# Patient Record
Sex: FEMALE | Race: WHITE | NOT HISPANIC OR LATINO | Employment: UNEMPLOYED | URBAN - METROPOLITAN AREA
[De-identification: names, ages, dates, MRNs, and addresses within clinical notes are randomized per-mention and may not be internally consistent; named-entity substitution may affect disease eponyms.]

---

## 2023-12-04 ENCOUNTER — HOSPITAL ENCOUNTER (INPATIENT)
Facility: HOSPITAL | Age: 53
LOS: 9 days | Discharge: HOME | DRG: 753 | End: 2023-12-13
Attending: PSYCHIATRY & NEUROLOGY | Admitting: STUDENT IN AN ORGANIZED HEALTH CARE EDUCATION/TRAINING PROGRAM
Payer: COMMERCIAL

## 2023-12-04 ENCOUNTER — HOSPITAL ENCOUNTER (EMERGENCY)
Facility: HOSPITAL | Age: 53
End: 2023-12-04
Attending: FAMILY MEDICINE | Admitting: EMERGENCY MEDICINE
Payer: COMMERCIAL

## 2023-12-04 VITALS
TEMPERATURE: 98.6 F | OXYGEN SATURATION: 96 % | SYSTOLIC BLOOD PRESSURE: 169 MMHG | DIASTOLIC BLOOD PRESSURE: 87 MMHG | RESPIRATION RATE: 18 BRPM | HEART RATE: 94 BPM | WEIGHT: 125 LBS

## 2023-12-04 DIAGNOSIS — M54.50 CHRONIC BILATERAL LOW BACK PAIN WITHOUT SCIATICA: ICD-10-CM

## 2023-12-04 DIAGNOSIS — F33.2 SEVERE EPISODE OF RECURRENT MAJOR DEPRESSIVE DISORDER, WITHOUT PSYCHOTIC FEATURES (HCC): Primary | ICD-10-CM

## 2023-12-04 DIAGNOSIS — Z00.8 MEDICAL CLEARANCE FOR PSYCHIATRIC ADMISSION: ICD-10-CM

## 2023-12-04 DIAGNOSIS — Z00.8 ENCOUNTER FOR PSYCHOLOGICAL EVALUATION: Primary | ICD-10-CM

## 2023-12-04 DIAGNOSIS — R45.851 SUICIDAL IDEATIONS: ICD-10-CM

## 2023-12-04 DIAGNOSIS — E11.9 TYPE 2 DIABETES MELLITUS WITH HEMOGLOBIN A1C GOAL OF LESS THAN 7.0% (HCC): Chronic | ICD-10-CM

## 2023-12-04 DIAGNOSIS — G89.29 CHRONIC BILATERAL LOW BACK PAIN WITHOUT SCIATICA: ICD-10-CM

## 2023-12-04 DIAGNOSIS — Z72.0 TOBACCO ABUSE: ICD-10-CM

## 2023-12-04 LAB
ALBUMIN SERPL BCP-MCNC: 4.3 G/DL (ref 3.5–5)
ALP SERPL-CCNC: 61 U/L (ref 34–104)
ALT SERPL W P-5'-P-CCNC: 27 U/L (ref 7–52)
AMPHETAMINES SERPL QL SCN: NEGATIVE
ANION GAP SERPL CALCULATED.3IONS-SCNC: 8 MMOL/L
AST SERPL W P-5'-P-CCNC: 28 U/L (ref 13–39)
BACTERIA UR QL AUTO: ABNORMAL /HPF
BARBITURATES UR QL: NEGATIVE
BASOPHILS # BLD AUTO: 0.02 THOUSANDS/ÂΜL (ref 0–0.1)
BASOPHILS NFR BLD AUTO: 0 % (ref 0–1)
BENZODIAZ UR QL: NEGATIVE
BILIRUB SERPL-MCNC: 0.32 MG/DL (ref 0.2–1)
BILIRUB UR QL STRIP: ABNORMAL
BUN SERPL-MCNC: 18 MG/DL (ref 5–25)
CALCIUM SERPL-MCNC: 9.2 MG/DL (ref 8.4–10.2)
CHLORIDE SERPL-SCNC: 100 MMOL/L (ref 96–108)
CLARITY UR: CLEAR
CO2 SERPL-SCNC: 29 MMOL/L (ref 21–32)
COCAINE UR QL: NEGATIVE
COLOR UR: ABNORMAL
CREAT SERPL-MCNC: 0.61 MG/DL (ref 0.6–1.3)
EOSINOPHIL # BLD AUTO: 0.17 THOUSAND/ÂΜL (ref 0–0.61)
EOSINOPHIL NFR BLD AUTO: 4 % (ref 0–6)
ERYTHROCYTE [DISTWIDTH] IN BLOOD BY AUTOMATED COUNT: 12.5 % (ref 11.6–15.1)
ETHANOL EXG-MCNC: 0 MG/DL
GFR SERPL CREATININE-BSD FRML MDRD: 102 ML/MIN/1.73SQ M
GLUCOSE SERPL-MCNC: 162 MG/DL (ref 65–140)
GLUCOSE SERPL-MCNC: 188 MG/DL (ref 65–140)
GLUCOSE UR STRIP-MCNC: NEGATIVE MG/DL
HCT VFR BLD AUTO: 39.6 % (ref 34.8–46.1)
HGB BLD-MCNC: 12.8 G/DL (ref 11.5–15.4)
HGB UR QL STRIP.AUTO: NEGATIVE
HYALINE CASTS #/AREA URNS LPF: ABNORMAL /LPF
IMM GRANULOCYTES # BLD AUTO: 0.01 THOUSAND/UL (ref 0–0.2)
IMM GRANULOCYTES NFR BLD AUTO: 0 % (ref 0–2)
KETONES UR STRIP-MCNC: ABNORMAL MG/DL
LEUKOCYTE ESTERASE UR QL STRIP: NEGATIVE
LYMPHOCYTES # BLD AUTO: 1.83 THOUSANDS/ÂΜL (ref 0.6–4.47)
LYMPHOCYTES NFR BLD AUTO: 38 % (ref 14–44)
MCH RBC QN AUTO: 28.7 PG (ref 26.8–34.3)
MCHC RBC AUTO-ENTMCNC: 32.3 G/DL (ref 31.4–37.4)
MCV RBC AUTO: 89 FL (ref 82–98)
MONOCYTES # BLD AUTO: 0.58 THOUSAND/ÂΜL (ref 0.17–1.22)
MONOCYTES NFR BLD AUTO: 12 % (ref 4–12)
NEUTROPHILS # BLD AUTO: 2.18 THOUSANDS/ÂΜL (ref 1.85–7.62)
NEUTS SEG NFR BLD AUTO: 46 % (ref 43–75)
NITRITE UR QL STRIP: NEGATIVE
NON-SQ EPI CELLS URNS QL MICRO: ABNORMAL /HPF
NRBC BLD AUTO-RTO: 0 /100 WBCS
OPIATES UR QL SCN: NEGATIVE
OXYCODONE+OXYMORPHONE UR QL SCN: NEGATIVE
PCP UR QL: NEGATIVE
PH UR STRIP.AUTO: 5 [PH]
PLATELET # BLD AUTO: 249 THOUSANDS/UL (ref 149–390)
PMV BLD AUTO: 11.6 FL (ref 8.9–12.7)
POTASSIUM SERPL-SCNC: 3.8 MMOL/L (ref 3.5–5.3)
PROT SERPL-MCNC: 6.8 G/DL (ref 6.4–8.4)
PROT UR STRIP-MCNC: ABNORMAL MG/DL
RBC # BLD AUTO: 4.46 MILLION/UL (ref 3.81–5.12)
RBC #/AREA URNS AUTO: ABNORMAL /HPF
SODIUM SERPL-SCNC: 137 MMOL/L (ref 135–147)
SP GR UR STRIP.AUTO: >=1.03
THC UR QL: NEGATIVE
TSH SERPL DL<=0.05 MIU/L-ACNC: 0.69 UIU/ML (ref 0.45–4.5)
UROBILINOGEN UR QL STRIP.AUTO: 0.2 E.U./DL
WBC # BLD AUTO: 4.79 THOUSAND/UL (ref 4.31–10.16)
WBC #/AREA URNS AUTO: ABNORMAL /HPF

## 2023-12-04 PROCEDURE — 99285 EMERGENCY DEPT VISIT HI MDM: CPT | Performed by: FAMILY MEDICINE

## 2023-12-04 PROCEDURE — 81001 URINALYSIS AUTO W/SCOPE: CPT | Performed by: FAMILY MEDICINE

## 2023-12-04 PROCEDURE — 80053 COMPREHEN METABOLIC PANEL: CPT | Performed by: FAMILY MEDICINE

## 2023-12-04 PROCEDURE — 36415 COLL VENOUS BLD VENIPUNCTURE: CPT | Performed by: FAMILY MEDICINE

## 2023-12-04 PROCEDURE — 80307 DRUG TEST PRSMV CHEM ANLYZR: CPT | Performed by: FAMILY MEDICINE

## 2023-12-04 PROCEDURE — 99285 EMERGENCY DEPT VISIT HI MDM: CPT

## 2023-12-04 PROCEDURE — 82075 ASSAY OF BREATH ETHANOL: CPT | Performed by: FAMILY MEDICINE

## 2023-12-04 PROCEDURE — 93005 ELECTROCARDIOGRAM TRACING: CPT

## 2023-12-04 PROCEDURE — 81003 URINALYSIS AUTO W/O SCOPE: CPT | Performed by: FAMILY MEDICINE

## 2023-12-04 PROCEDURE — 84443 ASSAY THYROID STIM HORMONE: CPT | Performed by: FAMILY MEDICINE

## 2023-12-04 PROCEDURE — 85025 COMPLETE CBC W/AUTO DIFF WBC: CPT | Performed by: FAMILY MEDICINE

## 2023-12-04 PROCEDURE — 82948 REAGENT STRIP/BLOOD GLUCOSE: CPT

## 2023-12-04 RX ORDER — DULOXETIN HYDROCHLORIDE 30 MG/1
60 CAPSULE, DELAYED RELEASE ORAL DAILY
Status: DISCONTINUED | OUTPATIENT
Start: 2023-12-05 | End: 2023-12-04 | Stop reason: HOSPADM

## 2023-12-04 RX ORDER — NICOTINE 21 MG/24HR
21 PATCH, TRANSDERMAL 24 HOURS TRANSDERMAL ONCE
Status: DISCONTINUED | OUTPATIENT
Start: 2023-12-04 | End: 2023-12-04 | Stop reason: HOSPADM

## 2023-12-04 RX ORDER — INSULIN GLARGINE 100 [IU]/ML
20 INJECTION, SOLUTION SUBCUTANEOUS
Status: CANCELLED | OUTPATIENT
Start: 2023-12-04

## 2023-12-04 RX ORDER — ACETAMINOPHEN 325 MG/1
650 TABLET ORAL EVERY 6 HOURS PRN
Status: CANCELLED | OUTPATIENT
Start: 2023-12-04

## 2023-12-04 RX ORDER — IBUPROFEN 400 MG/1
400 TABLET ORAL EVERY 6 HOURS PRN
Status: CANCELLED | OUTPATIENT
Start: 2023-12-04

## 2023-12-04 RX ORDER — HYDROXYZINE 50 MG/1
50 TABLET, FILM COATED ORAL DAILY
Status: DISCONTINUED | OUTPATIENT
Start: 2023-12-04 | End: 2023-12-04 | Stop reason: HOSPADM

## 2023-12-04 RX ORDER — LANOLIN ALCOHOL/MO/W.PET/CERES
3 CREAM (GRAM) TOPICAL
Status: DISCONTINUED | OUTPATIENT
Start: 2023-12-04 | End: 2023-12-13 | Stop reason: HOSPADM

## 2023-12-04 RX ORDER — OLANZAPINE 2.5 MG/1
5 TABLET, FILM COATED ORAL
Status: CANCELLED | OUTPATIENT
Start: 2023-12-04

## 2023-12-04 RX ORDER — NICOTINE 21 MG/24HR
21 PATCH, TRANSDERMAL 24 HOURS TRANSDERMAL ONCE
Status: DISCONTINUED | OUTPATIENT
Start: 2023-12-05 | End: 2023-12-05

## 2023-12-04 RX ORDER — DULOXETIN HYDROCHLORIDE 30 MG/1
30 CAPSULE, DELAYED RELEASE ORAL
Status: DISCONTINUED | OUTPATIENT
Start: 2023-12-04 | End: 2023-12-04 | Stop reason: HOSPADM

## 2023-12-04 RX ORDER — METHOCARBAMOL 500 MG/1
750 TABLET, FILM COATED ORAL 3 TIMES DAILY PRN
Status: ON HOLD | COMMUNITY
Start: 2023-11-10

## 2023-12-04 RX ORDER — AMOXICILLIN 250 MG
1 CAPSULE ORAL DAILY PRN
Status: CANCELLED | OUTPATIENT
Start: 2023-12-04

## 2023-12-04 RX ORDER — MELOXICAM 15 MG/1
15 TABLET ORAL DAILY
Status: ON HOLD | COMMUNITY
Start: 2023-11-15 | End: 2024-11-14

## 2023-12-04 RX ORDER — TRAZODONE HYDROCHLORIDE 50 MG/1
50 TABLET ORAL
Status: DISCONTINUED | OUTPATIENT
Start: 2023-12-04 | End: 2023-12-08

## 2023-12-04 RX ORDER — INSULIN GLARGINE 100 [IU]/ML
20 INJECTION, SOLUTION SUBCUTANEOUS
Status: DISCONTINUED | OUTPATIENT
Start: 2023-12-04 | End: 2023-12-13 | Stop reason: HOSPADM

## 2023-12-04 RX ORDER — DULOXETIN HYDROCHLORIDE 30 MG/1
30 CAPSULE, DELAYED RELEASE ORAL
Status: ON HOLD | COMMUNITY
Start: 2023-11-10

## 2023-12-04 RX ORDER — IBUPROFEN 400 MG/1
400 TABLET ORAL EVERY 6 HOURS PRN
Status: DISCONTINUED | OUTPATIENT
Start: 2023-12-04 | End: 2023-12-05

## 2023-12-04 RX ORDER — GABAPENTIN 300 MG/1
300 CAPSULE ORAL 3 TIMES DAILY
Status: ON HOLD | COMMUNITY
Start: 2023-11-06 | End: 2024-11-05

## 2023-12-04 RX ORDER — LORAZEPAM 1 MG/1
1 TABLET ORAL
Status: CANCELLED | OUTPATIENT
Start: 2023-12-04

## 2023-12-04 RX ORDER — OLANZAPINE 2.5 MG/1
2.5 TABLET, FILM COATED ORAL
Status: DISCONTINUED | OUTPATIENT
Start: 2023-12-04 | End: 2023-12-13 | Stop reason: HOSPADM

## 2023-12-04 RX ORDER — PROPRANOLOL HYDROCHLORIDE 10 MG/1
5 TABLET ORAL EVERY 8 HOURS PRN
Status: CANCELLED | OUTPATIENT
Start: 2023-12-04

## 2023-12-04 RX ORDER — MAGNESIUM HYDROXIDE/ALUMINUM HYDROXICE/SIMETHICONE 120; 1200; 1200 MG/30ML; MG/30ML; MG/30ML
30 SUSPENSION ORAL EVERY 4 HOURS PRN
Status: CANCELLED | OUTPATIENT
Start: 2023-12-04

## 2023-12-04 RX ORDER — BENZTROPINE MESYLATE 1 MG/ML
1 INJECTION INTRAMUSCULAR; INTRAVENOUS
Status: CANCELLED | OUTPATIENT
Start: 2023-12-04

## 2023-12-04 RX ORDER — NICOTINE 21 MG/24HR
21 PATCH, TRANSDERMAL 24 HOURS TRANSDERMAL ONCE
Status: CANCELLED | OUTPATIENT
Start: 2023-12-05

## 2023-12-04 RX ORDER — OLANZAPINE 5 MG/1
5 TABLET ORAL
Status: DISCONTINUED | OUTPATIENT
Start: 2023-12-04 | End: 2023-12-13 | Stop reason: HOSPADM

## 2023-12-04 RX ORDER — AMOXICILLIN 250 MG
1 CAPSULE ORAL DAILY PRN
Status: DISCONTINUED | OUTPATIENT
Start: 2023-12-04 | End: 2023-12-13 | Stop reason: HOSPADM

## 2023-12-04 RX ORDER — HYDROXYZINE HYDROCHLORIDE 25 MG/1
25 TABLET, FILM COATED ORAL
Status: DISCONTINUED | OUTPATIENT
Start: 2023-12-04 | End: 2023-12-13 | Stop reason: HOSPADM

## 2023-12-04 RX ORDER — GABAPENTIN 300 MG/1
300 CAPSULE ORAL 3 TIMES DAILY
Status: DISCONTINUED | OUTPATIENT
Start: 2023-12-04 | End: 2023-12-04 | Stop reason: HOSPADM

## 2023-12-04 RX ORDER — LORAZEPAM 2 MG/ML
1 INJECTION INTRAMUSCULAR
Status: CANCELLED | OUTPATIENT
Start: 2023-12-04

## 2023-12-04 RX ORDER — INSULIN GLARGINE 100 [IU]/ML
20 INJECTION, SOLUTION SUBCUTANEOUS
Status: DISCONTINUED | OUTPATIENT
Start: 2023-12-04 | End: 2023-12-04 | Stop reason: HOSPADM

## 2023-12-04 RX ORDER — MAGNESIUM HYDROXIDE/ALUMINUM HYDROXICE/SIMETHICONE 120; 1200; 1200 MG/30ML; MG/30ML; MG/30ML
30 SUSPENSION ORAL EVERY 4 HOURS PRN
Status: DISCONTINUED | OUTPATIENT
Start: 2023-12-04 | End: 2023-12-13 | Stop reason: HOSPADM

## 2023-12-04 RX ORDER — POLYETHYLENE GLYCOL 3350 17 G/17G
17 POWDER, FOR SOLUTION ORAL DAILY PRN
Status: DISCONTINUED | OUTPATIENT
Start: 2023-12-04 | End: 2023-12-13 | Stop reason: HOSPADM

## 2023-12-04 RX ORDER — BENZTROPINE MESYLATE 1 MG/ML
1 INJECTION INTRAMUSCULAR; INTRAVENOUS
Status: DISCONTINUED | OUTPATIENT
Start: 2023-12-04 | End: 2023-12-13 | Stop reason: HOSPADM

## 2023-12-04 RX ORDER — ACETAMINOPHEN 325 MG/1
650 TABLET ORAL EVERY 6 HOURS PRN
Status: DISCONTINUED | OUTPATIENT
Start: 2023-12-04 | End: 2023-12-05

## 2023-12-04 RX ORDER — LORAZEPAM 2 MG/ML
1 INJECTION INTRAMUSCULAR
Status: DISCONTINUED | OUTPATIENT
Start: 2023-12-04 | End: 2023-12-13 | Stop reason: HOSPADM

## 2023-12-04 RX ORDER — HYDROXYZINE 50 MG/1
25 TABLET, FILM COATED ORAL
Status: CANCELLED | OUTPATIENT
Start: 2023-12-04

## 2023-12-04 RX ORDER — TRAZODONE HYDROCHLORIDE 50 MG/1
50 TABLET ORAL
Status: CANCELLED | OUTPATIENT
Start: 2023-12-04

## 2023-12-04 RX ORDER — INSULIN LISPRO 100 [IU]/ML
INJECTION, SOLUTION INTRAVENOUS; SUBCUTANEOUS
Status: ON HOLD | COMMUNITY

## 2023-12-04 RX ORDER — LANOLIN ALCOHOL/MO/W.PET/CERES
3 CREAM (GRAM) TOPICAL
Status: CANCELLED | OUTPATIENT
Start: 2023-12-04

## 2023-12-04 RX ORDER — BISACODYL 10 MG
10 SUPPOSITORY, RECTAL RECTAL DAILY PRN
Status: DISCONTINUED | OUTPATIENT
Start: 2023-12-04 | End: 2023-12-13 | Stop reason: HOSPADM

## 2023-12-04 RX ORDER — IBUPROFEN 400 MG/1
800 TABLET ORAL ONCE
Status: COMPLETED | OUTPATIENT
Start: 2023-12-04 | End: 2023-12-04

## 2023-12-04 RX ORDER — HYDROXYZINE 50 MG/1
50 TABLET, FILM COATED ORAL
Status: DISCONTINUED | OUTPATIENT
Start: 2023-12-04 | End: 2023-12-13 | Stop reason: HOSPADM

## 2023-12-04 RX ORDER — HYDROXYZINE 50 MG/1
50 TABLET, FILM COATED ORAL
Status: CANCELLED | OUTPATIENT
Start: 2023-12-04

## 2023-12-04 RX ORDER — LORAZEPAM 1 MG/1
1 TABLET ORAL
Status: DISCONTINUED | OUTPATIENT
Start: 2023-12-04 | End: 2023-12-13 | Stop reason: HOSPADM

## 2023-12-04 RX ORDER — PROPRANOLOL HYDROCHLORIDE 10 MG/1
5 TABLET ORAL EVERY 8 HOURS PRN
Status: DISCONTINUED | OUTPATIENT
Start: 2023-12-04 | End: 2023-12-13 | Stop reason: HOSPADM

## 2023-12-04 RX ORDER — BENZTROPINE MESYLATE 0.5 MG/1
0.5 TABLET ORAL
Status: DISCONTINUED | OUTPATIENT
Start: 2023-12-04 | End: 2023-12-13 | Stop reason: HOSPADM

## 2023-12-04 RX ORDER — INSULIN GLARGINE 100 [IU]/ML
20 INJECTION, SOLUTION SUBCUTANEOUS
Status: ON HOLD | COMMUNITY

## 2023-12-04 RX ORDER — IBUPROFEN 600 MG/1
600 TABLET ORAL EVERY 6 HOURS PRN
Status: DISCONTINUED | OUTPATIENT
Start: 2023-12-04 | End: 2023-12-05

## 2023-12-04 RX ORDER — OLANZAPINE 10 MG/2ML
5 INJECTION, POWDER, FOR SOLUTION INTRAMUSCULAR
Status: CANCELLED | OUTPATIENT
Start: 2023-12-04

## 2023-12-04 RX ORDER — OLANZAPINE 10 MG/2ML
5 INJECTION, POWDER, FOR SOLUTION INTRAMUSCULAR
Status: DISCONTINUED | OUTPATIENT
Start: 2023-12-04 | End: 2023-12-13 | Stop reason: HOSPADM

## 2023-12-04 RX ORDER — BISACODYL 10 MG
10 SUPPOSITORY, RECTAL RECTAL DAILY PRN
Status: CANCELLED | OUTPATIENT
Start: 2023-12-04

## 2023-12-04 RX ORDER — OLANZAPINE 2.5 MG/1
2.5 TABLET, FILM COATED ORAL
Status: CANCELLED | OUTPATIENT
Start: 2023-12-04

## 2023-12-04 RX ORDER — DULOXETIN HYDROCHLORIDE 60 MG/1
60 CAPSULE, DELAYED RELEASE ORAL DAILY
Status: ON HOLD | COMMUNITY
Start: 2023-11-10 | End: 2024-11-09

## 2023-12-04 RX ORDER — POLYETHYLENE GLYCOL 3350 17 G/17G
17 POWDER, FOR SOLUTION ORAL DAILY PRN
Status: CANCELLED | OUTPATIENT
Start: 2023-12-04

## 2023-12-04 RX ORDER — BENZTROPINE MESYLATE 0.5 MG/1
0.5 TABLET ORAL
Status: CANCELLED | OUTPATIENT
Start: 2023-12-04

## 2023-12-04 RX ORDER — MELOXICAM 7.5 MG/1
15 TABLET ORAL DAILY
Status: DISCONTINUED | OUTPATIENT
Start: 2023-12-04 | End: 2023-12-04 | Stop reason: HOSPADM

## 2023-12-04 RX ADMIN — METFORMIN HYDROCHLORIDE 1000 MG: 500 TABLET, FILM COATED ORAL at 18:37

## 2023-12-04 RX ADMIN — NICOTINE 21 MG: 21 PATCH, EXTENDED RELEASE TRANSDERMAL at 15:04

## 2023-12-04 RX ADMIN — HYDROXYZINE HYDROCHLORIDE 50 MG: 50 TABLET, FILM COATED ORAL at 11:13

## 2023-12-04 RX ADMIN — TRAZODONE HYDROCHLORIDE 50 MG: 50 TABLET ORAL at 21:29

## 2023-12-04 RX ADMIN — IBUPROFEN 800 MG: 400 TABLET, FILM COATED ORAL at 14:36

## 2023-12-04 RX ADMIN — INSULIN GLARGINE 20 UNITS: 100 INJECTION, SOLUTION SUBCUTANEOUS at 21:17

## 2023-12-04 RX ADMIN — GABAPENTIN 300 MG: 300 CAPSULE ORAL at 15:05

## 2023-12-04 RX ADMIN — IBUPROFEN 600 MG: 600 TABLET ORAL at 21:29

## 2023-12-04 RX ADMIN — MELATONIN TAB 3 MG 3 MG: 3 TAB at 21:17

## 2023-12-04 RX ADMIN — MELOXICAM 15 MG: 7.5 TABLET ORAL at 15:05

## 2023-12-04 NOTE — ED PROVIDER NOTES
History  Chief Complaint   Patient presents with    Psychiatric Evaluation     Patient arrives with  due to feeling suicidal. Patient has felt this way since Friday and this morning sat at a bus stop for 2 hours trying to gain the courage to jump in front of a bus. Last suicide attempt was in 2014 which she overdosed on medications. 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 Patient states that she felt this way since Friday and wanted to to jump in front of a bus. She states that she could not get her to jump. She denies a history of suicidal attempt in the past in 2014 when she overdosed on medication. Denies any chest pain shortness of breath. Awake alert oriented x 3 GCS 15. Prior to Admission Medications   Prescriptions Last Dose Informant Patient Reported? Taking?    DULoxetine (CYMBALTA) 30 mg delayed release capsule   Yes Yes   Sig: Take 30 mg by mouth daily at bedtime   DULoxetine (CYMBALTA) 60 mg delayed release capsule   Yes Yes   Sig: Take 60 mg by mouth daily   gabapentin (NEURONTIN) 300 mg capsule   Yes Yes   Sig: Take 300 mg by mouth Three times a day   insulin glargine (LANTUS) 100 units/mL subcutaneous injection   Yes Yes   Sig: Inject 20 Units under the skin daily at bedtime   insulin lispro (HumaLOG) 100 units/mL injection   Yes Yes   Sig: Inject under the skin Sliding scale   meloxicam (MOBIC) 15 mg tablet   Yes Yes   Sig: Take 15 mg by mouth daily   metFORMIN (GLUCOPHAGE) 1000 MG tablet   Yes Yes   Sig: Take 1,000 mg by mouth 2 (two) times a day   methocarbamol (ROBAXIN) 500 mg tablet   Yes Yes   Sig: Take 750 mg by mouth Three times daily as needed      Facility-Administered Medications: None       Past Medical History:   Diagnosis Date Diabetes mellitus ([address])     Hypercholesteremia        History reviewed. No pertinent surgical history. History reviewed. No pertinent family history. I have reviewed and agree with the history as documented. E-Cigarette/Vaping    E-Cigarette Use Never User      E-Cigarette/Vaping Substances     Social History     Tobacco Use    Smoking status: Every Day     Packs/day: 0.50     Types: Cigarettes    Smokeless tobacco: Former   Vaping Use    Vaping Use: Never used   Substance Use Topics    Alcohol use: Not Currently    Drug use: Not Currently       Review of Systems   Constitutional:  Negative for chills and fever. HENT:  Negative for rhinorrhea and sore throat. Eyes:  Negative for visual disturbance. Respiratory:  Negative for cough and shortness of breath. Cardiovascular:  Negative for chest pain and leg swelling. Gastrointestinal:  Negative for abdominal pain, diarrhea, nausea and vomiting. Genitourinary:  Negative for dysuria. Musculoskeletal:  Negative for back pain and myalgias. Skin:  Negative for rash. Neurological:  Negative for dizziness and headaches. Psychiatric/Behavioral:  Positive for suicidal ideas. Negative for confusion. The patient is nervous/anxious. All other systems reviewed and are negative. Physical Exam  Physical Exam  Vitals and nursing note reviewed. Constitutional:       Appearance: She is well-developed. HENT:      Head: Normocephalic and atraumatic. Right Ear: External ear normal.      Left Ear: External ear normal.      Nose: Nose normal.      Mouth/Throat:      Mouth: Mucous membranes are moist.      Pharynx: No oropharyngeal exudate. Eyes:      General: No scleral icterus. Right eye: No discharge. Left eye: No discharge. Conjunctiva/sclera: Conjunctivae normal.      Pupils: Pupils are equal, round, and reactive to light. Cardiovascular:      Rate and Rhythm: Normal rate and regular rhythm. Pulses: Normal pulses. Heart sounds: Normal heart sounds. Pulmonary:      Effort: Pulmonary effort is normal. No respiratory distress. Breath sounds: Normal breath sounds. No wheezing. Abdominal:      General: Bowel sounds are normal.      Palpations: Abdomen is soft. Musculoskeletal:         General: Normal range of motion. Cervical back: Normal range of motion and neck supple. Lymphadenopathy:      Cervical: No cervical adenopathy. Skin:     General: Skin is warm and dry. Capillary Refill: Capillary refill takes less than 2 seconds. Neurological:      General: No focal deficit present. Mental Status: She is alert and oriented to person, place, and time.    Psychiatric:         Mood and Affect: Mood normal.         Behavior: Behavior normal.         Vital Signs  ED Triage Vitals   Temperature Pulse Respirations Blood Pressure SpO2   12/04/23 1014 12/04/23 1006 12/04/23 1006 12/04/23 1006 12/04/23 1006   98.6 °F (37 °C) 94 18 169/87 96 %      Temp Source Heart Rate Source Patient Position - Orthostatic VS BP Location FiO2 (%)   12/04/23 1014 12/04/23 1006 12/04/23 1006 12/04/23 1006 --   Temporal Monitor Sitting Left arm       Pain Score       12/04/23 1006       7           Vitals:    12/04/23 1006   BP: 169/87   Pulse: 94   Patient Position - Orthostatic VS: Sitting         Visual Acuity      ED Medications  Medications   DULoxetine (CYMBALTA) delayed release capsule 30 mg (has no administration in time range)   DULoxetine (CYMBALTA) delayed release capsule 60 mg (has no administration in time range)   gabapentin (NEURONTIN) capsule 300 mg (300 mg Oral Given 12/4/23 1505)   metFORMIN (GLUCOPHAGE) tablet 1,000 mg (has no administration in time range)   insulin glargine (LANTUS) subcutaneous injection 20 Units 0.2 mL (has no administration in time range)   meloxicam (MOBIC) tablet 15 mg (15 mg Oral Given 12/4/23 1505)   hydrOXYzine HCL (ATARAX) tablet 50 mg (50 mg Oral Given 12/4/23 1113)   nicotine (NICODERM CQ) 21 mg/24 hr TD 24 hr patch 21 mg (21 mg Transdermal Medication Applied 12/4/23 1504)   ibuprofen (MOTRIN) tablet 800 mg (800 mg Oral Given 12/4/23 1436)       Diagnostic Studies  Results Reviewed       Procedure Component Value Units Date/Time    Urine Microscopic [[id number]]  (Abnormal) Collected: 12/04/23 1106    Lab Status: Final result Specimen: Urine, Clean Catch Updated: 12/04/23 1508     RBC, UA None Seen /hpf      WBC, UA 2-4 /hpf      Epithelial Cells Occasional /hpf      Bacteria, UA None Seen /hpf      Hyaline Casts, UA 0-1 /lpf     UA w Reflex to Microscopic w Reflex to Culture [[id number]]  (Abnormal) Collected: 12/04/23 1106    Lab Status: Final result Specimen: Urine, Clean Catch Updated: 12/04/23 1443     Color, UA Deb     Clarity, UA Clear     Specific Gravity, UA >=1.030     pH, UA 5.0     Leukocytes, UA Negative     Nitrite, UA Negative     Protein, UA 1+ mg/dl      Glucose, UA Negative mg/dl      Ketones, UA Trace mg/dl      Urobilinogen, UA 0.2 E.U./dl      Bilirubin, UA 1+     Occult Blood, UA Negative    TSH [[id number]]  (Normal) Collected: 12/04/23 1343    Lab Status: Final result Specimen: Blood from Arm, Left Updated: 12/04/23 1429     TSH 3RD GENERATON 0.692 uIU/mL     Comprehensive metabolic panel [[id number]]  (Abnormal) Collected: 12/04/23 1343    Lab Status: Final result Specimen: Blood from Arm, Left Updated: 12/04/23 1414     Sodium 137 mmol/L      Potassium 3.8 mmol/L      Chloride 100 mmol/L      CO2 29 mmol/L      ANION GAP 8 mmol/L      BUN 18 mg/dL      Creatinine 0.61 mg/dL      Glucose 188 mg/dL      Calcium 9.2 mg/dL      AST 28 U/L      ALT 27 U/L      Alkaline Phosphatase 61 U/L      Total Protein 6.8 g/dL      Albumin 4.3 g/dL      Total Bilirubin 0.32 mg/dL      eGFR 102 ml/min/1.73sq m     Narrative:      [facility] ([facility]D):     Stage 1 with normal or high GFR (GFR > 90 mL/min/1.73 square meters) Stage 2 Mild CKD (GFR = 60-89 mL/min/1.73 square meters)    Stage 3A Moderate CKD (GFR = 45-59 mL/min/1.73 square meters)    Stage 3B Moderate CKD (GFR = 30-44 mL/min/1.73 square meters)    Stage 4 Severe CKD (GFR = 15-29 mL/min/1.73 square meters)    Stage 5 End Stage CKD (GFR <15 mL/min/1.73 square meters)  Note: GFR calculation is accurate only with a steady state creatinine    CBC and differential [[id number]] Collected: 12/04/23 1343    Lab Status: Final result Specimen: Blood from Arm, Left Updated: 12/04/23 1354     WBC 4.79 Thousand/uL      RBC 4.46 Million/uL      Hemoglobin 12.8 g/dL      Hematocrit 39.6 %      MCV 89 fL      MCH 28.7 pg      MCHC 32.3 g/dL      RDW 12.5 %      MPV 11.6 fL      Platelets 624 Thousands/uL      nRBC 0 /100 WBCs      Neutrophils Relative 46 %      Immat GRANS % 0 %      Lymphocytes Relative 38 %      Monocytes Relative 12 %      Eosinophils Relative 4 %      Basophils Relative 0 %      Neutrophils Absolute 2.18 Thousands/µL      Immature Grans Absolute 0.01 Thousand/uL      Lymphocytes Absolute 1.83 Thousands/µL      Monocytes Absolute 0.58 Thousand/µL      Eosinophils Absolute 0.17 Thousand/µL      Basophils Absolute 0.02 Thousands/µL     Rapid drug screen, urine [[id number]] Collected: 12/04/23 1106    Lab Status: Final result Specimen: Urine, Clean Catch Updated: 12/04/23 1203     Amph/Meth UR Negative     Barbiturate Ur Negative     Benzodiazepine Urine Negative     Cocaine Urine Negative     Methadone Urine --     Opiate Urine Negative     PCP Ur Negative     THC Urine Negative     Oxycodone Urine Negative    Narrative:      Test not performed; call laboratory if required. FOR MEDICAL PURPOSES ONLY. IF CONFIRMATION NEEDED PLEASE CONTACT THE LAB WITHIN 5 DAYS.     Drug Screen Cutoff Levels:  AMPHETAMINE/METHAMPHETAMINES  1000 ng/mL  BARBITURATES     200 ng/mL  BENZODIAZEPINES     200 ng/mL  COCAINE      300 ng/mL  METHADONE      300 ng/mL  OPIATES      300 ng/mL  PHENCYCLIDINE     25 ng/mL  THC       50 ng/mL  OXYCODONE      100 ng/mL    POCT alcohol breath test [[id number]]  (Normal) Resulted: 12/04/23 1108    Lab Status: Final result Updated: 12/04/23 1108     EXTBreath Alcohol 0.00                   No orders to display              Procedures  Procedures         ED Course       Patient is medically clear for inpatient behavioral unit admission. Patient is seen by crisis patient signed 12. SBIRT 20yo+      Flowsheet Row Most Recent Value   Initial Alcohol Screen: US AUDIT-C     1. How often do you have a drink containing alcohol? 0 Filed at: 12/04/2023 1011   2. How many drinks containing alcohol do you have on a typical day you are drinking? 0 Filed at: 12/04/2023 1011   3a. Male UNDER 65: How often do you have five or more drinks on one occasion? 0 Filed at: 12/04/2023 1011   3b. FEMALE Any Age, or MALE 65+: How often do you have 4 or more drinks on one occassion? 0 Filed at: 12/04/2023 1011   Audit-C Score 0 Filed at: 12/04/2023 1011   STACIE: How many times in the past year have you. .. Used an illegal drug or used a prescription medication for non-medical reasons? Never Filed at: 12/04/2023 1011                      Medical Decision Making  49-year-old female in recovery from methamphetamine presented to ED with the suicidal ideation. History is taken from patient. Patient is medically clear for inpatient behavioral unit admission. Patient is seen by crisis patient signed 12. Pending placement  Patient care signed out to Natan Rivero    Amount and/or Complexity of Data Reviewed  Labs: ordered. Risk  OTC drugs. Prescription drug management. Decision regarding hospitalization.              Disposition  Final diagnoses:   Encounter for psychological evaluation   Suicidal ideations     Time reflects when diagnosis was documented in both MDM as applicable and the Disposition within this note       Time User Action Codes Description Comment    12/4/2023  1:58 PM Samuel Plascencia Add [Z00.8] Encounter for psychological evaluation     12/4/2023  1:58 PM Samuel Quinones [G57.880] Suicidal ideations           ED Disposition       ED Disposition   Transfer to [address] [place] [address]   --    Date/Time   Mon Dec 4, 2023 [address] should be transferred out to  and has been medically cleared. MD Documentation      Kimberly Payton Most Recent Value   Patient Condition The patient has been stabilized such that within reasonable medical probability, no material deterioration of the patient condition or the condition of the unborn child(hi) is likely to result from the transfer   Reason for Transfer Level of Care needed not available at this facility   Benefits of Transfer Continuity of care   Risks of Transfer Potential for delay in receiving treatment   Accepting Physician [address] [place] [address] Name, [address] [place] [address]    (Name & Tel number) [facility] [phone]   Transported by (Company and Unit #) [facility]andra [facility]rera   Sending MD [facility]   Provider Certification The patient is stable for psychiatric transfer because they are medically stable, and is protected from harming him/herself or others during transport          RN Documentation      [address] Name, [address] [place] [address]    ([facility] & Tel number) [facility] [phone]   Medications Reviewed with Next Provider of Service Yes   Transport Mode Ambulance   Transported by (Company and Unit #) [facility]   Level of Care Basic life support   Patient Belongings Disposition Sent with patient   Transfer Date 12/04/23   Transfer Time 1800          Follow-up Information    None         Patient's Medications   Discharge Prescriptions    No medications on file       No discharge procedures on file.     PDMP Review       None            ED Provider  Electronically Signed by             Gigi Callahan MD  12/04/23 2464

## 2023-12-04 NOTE — ED NOTES
Patient is accepted at [address] [facility] [address]  Patient is accepted by Dr. Solis Case  per [address] is arranged with [employer]. Transportation is TBD        Nurse report is to be called to [phone]  prior to patient transfer.

## 2023-12-04 NOTE — ED NOTES
Crisis prepared hospital packet, copies obtained for the record, EMTALA and Medical Necessity. Pt updated.

## 2023-12-04 NOTE — ED NOTES
Met with patient and completed the crisis intake assessment as well as the safety risk assessment. Patient arrived to the ER via private vehicle. Patients states she has been feeling overwhelmed and the death of her sons dog due to a hit and run last night was the straw that broke her. " I got up in the middle of the night and walked to the bus terminal and I contemplated on how to make it look like an accident so my family wouldn't think I was a coward or failure." Patient continues to report  SI with the inability to contract for safety. Patient also reports disturbances with sleep and appetite (weight loss of 50lbs x 9 months) as well as lack of concentration and motivation. Patient states she also suffers from chronic pain which lead to addiction issues. "Akil Jones been clean again sine Oct 2023"  addiction issues with opiates and meth. Patient in agreement to sign a voluntary admission. Voluntary rights and 72 hour notice explained to patient, patient verbalized and understanding. Original placed on patients chart. Bed search and insurance in progress.

## 2023-12-04 NOTE — LETTER
[address] [address]  Dept: [phone]      EMTALA TRANSFER CONSENT    NAME Maxwell Byers                                         1970                              MRN [id number]    I have been informed of my rights regarding examination, treatment, and transfer   by Dr. Yemi Joe MD    Benefits: Continuity of care    Risks: Potential for delay in receiving treatment      Consent for Transfer:  I acknowledge that my medical condition has been evaluated and explained to me by the emergency department physician or other qualified medical person and/or my attending physician, who has recommended that I be transferred to the service of  Accepting Physician: Bishnu Saenz at [address] [facility] [address] Name, [facility] : [place], [place]. The above potential benefits of such transfer, the potential risks associated with such transfer, and the probable risks of not being transferred have been explained to me, and I fully understand them. The doctor has explained that, in my case, the benefits of transfer outweigh the risks. I agree to be transferred. I authorize the performance of emergency medical procedures and treatments upon me in both transit and upon arrival at the receiving facility. Additionally, I authorize the release of any and all medical records to the receiving facility and request they be transported with me, if possible. I understand that the safest mode of transportation during a medical emergency is an ambulance and that the Hospital advocates the use of this mode of transport. Risks of traveling to the receiving facility by car, including absence of medical control, life sustaining equipment, such as oxygen, and medical personnel has been explained to me and I fully understand them. (NYDIA CORRECT BOX BELOW)  [ X ]  I consent to the stated transfer and to be transported by ambulance/helicopter.   [  ]  I consent to the stated transfer, but refuse transportation by ambulance and accept full responsibility for my transportation by car. I understand the risks of non-ambulance transfers and I exonerate the Hospital and its staff from any deterioration in my condition that results from this refusal.    X___________________________________________    DATE  23  TIME________  Signature of patient or legally responsible individual signing on patient behalf           RELATIONSHIP TO PATIENT___SELF______________________                    Provider Certification    NAME Rowena Franco                                         1970                              MRN [id number]    A medical screening exam was performed on the above named patient. Based on the examination:    Condition Necessitating Transfer The primary encounter diagnosis was Encounter for psychological evaluation. A diagnosis of Suicidal ideations was also pertinent to this visit. Patient Condition: The patient has been stabilized such that within reasonable medical probability, no material deterioration of the patient condition or the condition of the unborn child(hi) is likely to result from the transfer    Reason for Transfer: Level of Care needed not available at this facility    Transfer Requirements: Facility [facility], [facility], [address] available and qualified personnel available for treatment as acknowledged by [facility] [phone]  Agreed to accept transfer and to provide appropriate medical treatment as acknowledged by         Appropriate medical records of the examination and treatment of the patient are provided at the time of transfer   [address] __IK_____  Transfer will be performed by qualified personnel from [facility]  and appropriate transfer equipment as required, including the use of necessary and appropriate life support measures.     Provider Certification: I have examined the patient and explained the following risks and benefits of being transferred/refusing transfer to the patient/family:  The patient is stable for psychiatric transfer because they are medically stable, and is protected from harming him/herself or others during transport      Based on these reasonable risks and benefits to the patient and/or the unborn child(hi), and based upon the information available at the time of the patient’s examination, I certify that the medical benefits reasonably to be expected from the provision of appropriate medical treatments at another medical facility outweigh the increasing risks, if any, to the individual’s medical condition, and in the case of labor to the unborn child, from effecting the transfer.     X____________________________________________ DATE 12/04/23        TIME_______      ORIGINAL - SEND TO MEDICAL RECORDS   COPY - SEND WITH PATIENT DURING TRANSFER

## 2023-12-05 PROBLEM — F15.11 METHAMPHETAMINE ABUSE IN REMISSION (HCC): Status: ACTIVE | Noted: 2023-12-05

## 2023-12-05 PROBLEM — Z00.8 MEDICAL CLEARANCE FOR PSYCHIATRIC ADMISSION: Status: ACTIVE | Noted: 2023-12-05

## 2023-12-05 PROBLEM — Z72.0 TOBACCO ABUSE: Status: ACTIVE | Noted: 2023-12-05

## 2023-12-05 PROBLEM — E53.8 VITAMIN B12 DEFICIENCY: Status: ACTIVE | Noted: 2023-12-05

## 2023-12-05 PROBLEM — F11.90 OPIOID USE DISORDER: Chronic | Status: ACTIVE | Noted: 2023-12-05

## 2023-12-05 PROBLEM — G89.29 CHRONIC BILATERAL LOW BACK PAIN WITHOUT SCIATICA: Status: ACTIVE | Noted: 2020-06-24

## 2023-12-05 PROBLEM — M54.59 INTRACTABLE LOW BACK PAIN: Status: ACTIVE | Noted: 2023-10-12

## 2023-12-05 PROBLEM — F33.2 SEVERE EPISODE OF RECURRENT MAJOR DEPRESSIVE DISORDER, WITHOUT PSYCHOTIC FEATURES (HCC): Status: ACTIVE | Noted: 2023-12-05

## 2023-12-05 PROBLEM — M54.50 CHRONIC BILATERAL LOW BACK PAIN WITHOUT SCIATICA: Status: ACTIVE | Noted: 2020-06-24

## 2023-12-05 PROBLEM — Z00.8 MEDICAL CLEARANCE FOR PSYCHIATRIC ADMISSION: Status: RESOLVED | Noted: 2023-12-05 | Resolved: 2023-12-05

## 2023-12-05 LAB
25(OH)D3 SERPL-MCNC: 42 NG/ML (ref 30–100)
ALBUMIN SERPL BCP-MCNC: 4.3 G/DL (ref 3.5–5)
ALP SERPL-CCNC: 56 U/L (ref 34–104)
ALT SERPL W P-5'-P-CCNC: 23 U/L (ref 7–52)
ANION GAP SERPL CALCULATED.3IONS-SCNC: 8 MMOL/L
AST SERPL W P-5'-P-CCNC: 21 U/L (ref 13–39)
ATRIAL RATE: 94 BPM
BASOPHILS # BLD AUTO: 0.02 THOUSANDS/ÂΜL (ref 0–0.1)
BASOPHILS NFR BLD AUTO: 0 % (ref 0–1)
BILIRUB SERPL-MCNC: 0.47 MG/DL (ref 0.2–1)
BUN SERPL-MCNC: 17 MG/DL (ref 5–25)
CALCIUM SERPL-MCNC: 9.4 MG/DL (ref 8.4–10.2)
CHLORIDE SERPL-SCNC: 101 MMOL/L (ref 96–108)
CHOLEST SERPL-MCNC: 160 MG/DL
CO2 SERPL-SCNC: 28 MMOL/L (ref 21–32)
CREAT SERPL-MCNC: 0.51 MG/DL (ref 0.6–1.3)
EOSINOPHIL # BLD AUTO: 0.15 THOUSAND/ÂΜL (ref 0–0.61)
EOSINOPHIL NFR BLD AUTO: 2 % (ref 0–6)
ERYTHROCYTE [DISTWIDTH] IN BLOOD BY AUTOMATED COUNT: 12.7 % (ref 11.6–15.1)
EST. AVERAGE GLUCOSE BLD GHB EST-MCNC: 235 MG/DL
GFR SERPL CREATININE-BSD FRML MDRD: 108 ML/MIN/1.73SQ M
GLUCOSE P FAST SERPL-MCNC: 132 MG/DL (ref 65–99)
GLUCOSE SERPL-MCNC: 132 MG/DL (ref 65–140)
GLUCOSE SERPL-MCNC: 148 MG/DL (ref 65–140)
GLUCOSE SERPL-MCNC: 148 MG/DL (ref 65–140)
GLUCOSE SERPL-MCNC: 293 MG/DL (ref 65–140)
HBA1C MFR BLD: 9.8 %
HCT VFR BLD AUTO: 42.9 % (ref 34.8–46.1)
HDLC SERPL-MCNC: 64 MG/DL
HGB BLD-MCNC: 14 G/DL (ref 11.5–15.4)
IMM GRANULOCYTES # BLD AUTO: 0.02 THOUSAND/UL (ref 0–0.2)
IMM GRANULOCYTES NFR BLD AUTO: 0 % (ref 0–2)
LDLC SERPL CALC-MCNC: 83 MG/DL (ref 0–100)
LYMPHOCYTES # BLD AUTO: 2.03 THOUSANDS/ÂΜL (ref 0.6–4.47)
LYMPHOCYTES NFR BLD AUTO: 28 % (ref 14–44)
MAGNESIUM SERPL-MCNC: 1.9 MG/DL (ref 1.9–2.7)
MCH RBC QN AUTO: 28.2 PG (ref 26.8–34.3)
MCHC RBC AUTO-ENTMCNC: 32.6 G/DL (ref 31.4–37.4)
MCV RBC AUTO: 87 FL (ref 82–98)
MONOCYTES # BLD AUTO: 0.52 THOUSAND/ÂΜL (ref 0.17–1.22)
MONOCYTES NFR BLD AUTO: 7 % (ref 4–12)
NEUTROPHILS # BLD AUTO: 4.6 THOUSANDS/ÂΜL (ref 1.85–7.62)
NEUTS SEG NFR BLD AUTO: 63 % (ref 43–75)
NONHDLC SERPL-MCNC: 96 MG/DL
NRBC BLD AUTO-RTO: 0 /100 WBCS
P AXIS: 64 DEGREES
PLATELET # BLD AUTO: 257 THOUSANDS/UL (ref 149–390)
PMV BLD AUTO: 11.1 FL (ref 8.9–12.7)
POTASSIUM SERPL-SCNC: 4.3 MMOL/L (ref 3.5–5.3)
PR INTERVAL: 176 MS
PROT SERPL-MCNC: 6.5 G/DL (ref 6.4–8.4)
QRS AXIS: 15 DEGREES
QRSD INTERVAL: 88 MS
QT INTERVAL: 398 MS
QTC INTERVAL: 497 MS
RBC # BLD AUTO: 4.96 MILLION/UL (ref 3.81–5.12)
SODIUM SERPL-SCNC: 137 MMOL/L (ref 135–147)
T WAVE AXIS: 68 DEGREES
TRIGL SERPL-MCNC: 65 MG/DL
TSH SERPL DL<=0.05 MIU/L-ACNC: 0.64 UIU/ML (ref 0.45–4.5)
VENTRICULAR RATE: 94 BPM
VIT B12 SERPL-MCNC: 369 PG/ML (ref 180–914)
WBC # BLD AUTO: 7.34 THOUSAND/UL (ref 4.31–10.16)

## 2023-12-05 PROCEDURE — 82948 REAGENT STRIP/BLOOD GLUCOSE: CPT

## 2023-12-05 PROCEDURE — 80061 LIPID PANEL: CPT

## 2023-12-05 PROCEDURE — 83735 ASSAY OF MAGNESIUM: CPT

## 2023-12-05 PROCEDURE — 85025 COMPLETE CBC W/AUTO DIFF WBC: CPT

## 2023-12-05 PROCEDURE — 80053 COMPREHEN METABOLIC PANEL: CPT

## 2023-12-05 PROCEDURE — 82607 VITAMIN B-12: CPT

## 2023-12-05 PROCEDURE — 82306 VITAMIN D 25 HYDROXY: CPT

## 2023-12-05 PROCEDURE — 83036 HEMOGLOBIN GLYCOSYLATED A1C: CPT | Performed by: NURSE PRACTITIONER

## 2023-12-05 PROCEDURE — 99222 1ST HOSP IP/OBS MODERATE 55: CPT | Performed by: PSYCHIATRY & NEUROLOGY

## 2023-12-05 PROCEDURE — 84443 ASSAY THYROID STIM HORMONE: CPT

## 2023-12-05 PROCEDURE — 99221 1ST HOSP IP/OBS SF/LOW 40: CPT | Performed by: NURSE PRACTITIONER

## 2023-12-05 RX ORDER — IBUPROFEN 600 MG/1
600 TABLET ORAL EVERY 8 HOURS SCHEDULED
Status: DISCONTINUED | OUTPATIENT
Start: 2023-12-05 | End: 2023-12-13 | Stop reason: HOSPADM

## 2023-12-05 RX ORDER — CYANOCOBALAMIN 1000 UG/ML
1000 INJECTION, SOLUTION INTRAMUSCULAR; SUBCUTANEOUS
Status: DISCONTINUED | OUTPATIENT
Start: 2023-12-05 | End: 2023-12-13 | Stop reason: HOSPADM

## 2023-12-05 RX ORDER — NICOTINE 21 MG/24HR
1 PATCH, TRANSDERMAL 24 HOURS TRANSDERMAL DAILY
Status: DISCONTINUED | OUTPATIENT
Start: 2023-12-05 | End: 2023-12-13 | Stop reason: HOSPADM

## 2023-12-05 RX ORDER — MELOXICAM 15 MG/1
7.5 TABLET ORAL DAILY
Status: DISCONTINUED | OUTPATIENT
Start: 2023-12-05 | End: 2023-12-13 | Stop reason: HOSPADM

## 2023-12-05 RX ORDER — ARIPIPRAZOLE 2 MG/1
2 TABLET ORAL DAILY
Status: DISCONTINUED | OUTPATIENT
Start: 2023-12-05 | End: 2023-12-07

## 2023-12-05 RX ORDER — ALBUTEROL SULFATE 90 UG/1
2 AEROSOL, METERED RESPIRATORY (INHALATION) EVERY 4 HOURS PRN
Status: DISCONTINUED | OUTPATIENT
Start: 2023-12-05 | End: 2023-12-13 | Stop reason: HOSPADM

## 2023-12-05 RX ORDER — METHOCARBAMOL 750 MG/1
750 TABLET, FILM COATED ORAL EVERY 8 HOURS SCHEDULED
Status: DISCONTINUED | OUTPATIENT
Start: 2023-12-05 | End: 2023-12-13 | Stop reason: HOSPADM

## 2023-12-05 RX ORDER — INSULIN LISPRO 100 [IU]/ML
1-5 INJECTION, SOLUTION INTRAVENOUS; SUBCUTANEOUS
Status: DISCONTINUED | OUTPATIENT
Start: 2023-12-05 | End: 2023-12-13 | Stop reason: HOSPADM

## 2023-12-05 RX ORDER — INSULIN LISPRO 100 [IU]/ML
1-5 INJECTION, SOLUTION INTRAVENOUS; SUBCUTANEOUS
Status: DISCONTINUED | OUTPATIENT
Start: 2023-12-05 | End: 2023-12-11

## 2023-12-05 RX ORDER — LIDOCAINE 50 MG/G
1 PATCH TOPICAL DAILY
Status: DISCONTINUED | OUTPATIENT
Start: 2023-12-05 | End: 2023-12-08

## 2023-12-05 RX ORDER — GABAPENTIN 300 MG/1
600 CAPSULE ORAL 3 TIMES DAILY
Status: DISCONTINUED | OUTPATIENT
Start: 2023-12-05 | End: 2023-12-13 | Stop reason: HOSPADM

## 2023-12-05 RX ORDER — ACETAMINOPHEN 325 MG/1
975 TABLET ORAL EVERY 6 HOURS SCHEDULED
Status: DISCONTINUED | OUTPATIENT
Start: 2023-12-05 | End: 2023-12-13 | Stop reason: HOSPADM

## 2023-12-05 RX ORDER — NICOTINE 21 MG/24HR
1 PATCH, TRANSDERMAL 24 HOURS TRANSDERMAL DAILY
Status: DISCONTINUED | OUTPATIENT
Start: 2023-12-06 | End: 2023-12-05

## 2023-12-05 RX ADMIN — GABAPENTIN 600 MG: 300 CAPSULE ORAL at 21:18

## 2023-12-05 RX ADMIN — LIDOCAINE 1 PATCH: 700 PATCH TOPICAL at 11:35

## 2023-12-05 RX ADMIN — ACETAMINOPHEN 650 MG: 325 TABLET ORAL at 08:37

## 2023-12-05 RX ADMIN — HYDROXYZINE HYDROCHLORIDE 50 MG: 50 TABLET, FILM COATED ORAL at 08:37

## 2023-12-05 RX ADMIN — MELOXICAM 7.5 MG: 15 TABLET ORAL at 11:37

## 2023-12-05 RX ADMIN — IBUPROFEN 600 MG: 600 TABLET ORAL at 05:31

## 2023-12-05 RX ADMIN — CYANOCOBALAMIN 1000 MCG: 1000 INJECTION, SOLUTION INTRAMUSCULAR; SUBCUTANEOUS at 13:16

## 2023-12-05 RX ADMIN — INSULIN GLARGINE 20 UNITS: 100 INJECTION, SOLUTION SUBCUTANEOUS at 21:37

## 2023-12-05 RX ADMIN — GABAPENTIN 600 MG: 300 CAPSULE ORAL at 11:33

## 2023-12-05 RX ADMIN — GABAPENTIN 600 MG: 300 CAPSULE ORAL at 16:40

## 2023-12-05 RX ADMIN — ARIPIPRAZOLE 2 MG: 2 TABLET ORAL at 13:15

## 2023-12-05 RX ADMIN — IBUPROFEN 600 MG: 600 TABLET ORAL at 21:18

## 2023-12-05 RX ADMIN — ACETAMINOPHEN 975 MG: 325 TABLET ORAL at 17:05

## 2023-12-05 RX ADMIN — IBUPROFEN 600 MG: 600 TABLET ORAL at 13:16

## 2023-12-05 RX ADMIN — NICOTINE 1 PATCH: 21 PATCH, EXTENDED RELEASE TRANSDERMAL at 16:41

## 2023-12-05 RX ADMIN — METHOCARBAMOL 750 MG: 750 TABLET, FILM COATED ORAL at 21:18

## 2023-12-05 RX ADMIN — MELATONIN TAB 3 MG 3 MG: 3 TAB at 21:18

## 2023-12-05 RX ADMIN — INSULIN LISPRO 2 UNITS: 100 INJECTION, SOLUTION INTRAVENOUS; SUBCUTANEOUS at 12:24

## 2023-12-05 RX ADMIN — METHOCARBAMOL 750 MG: 750 TABLET, FILM COATED ORAL at 13:16

## 2023-12-05 RX ADMIN — DULOXETINE HYDROCHLORIDE 90 MG: 30 CAPSULE, DELAYED RELEASE ORAL at 13:15

## 2023-12-05 RX ADMIN — METFORMIN HYDROCHLORIDE 1000 MG: 500 TABLET, FILM COATED ORAL at 16:40

## 2023-12-05 NOTE — PROGRESS NOTES
12/05/23 0841   Team Meeting   Meeting Type Daily Rounds   Initial Conference Date 12/05/23   Team Members Present   Team Members Present Physician;Nurse;;; Occupational Therapist   Physician Team Member [facility] Team Member [facility] Management Team Member [address] Work Team Member Ed   OT Team Member Moises Delacruz   Patient/Family Present   Patient Present No   Patient's Family Present No     Patient here on a 12 after she was having SI to jump in front of a bus. She was unable to console her grandson after his dog was killed by a hit and run and she has had chronic pain. She labeled her grandson as a protective factor. She is unable to currently contract for safety. Discharge is pending.

## 2023-12-05 NOTE — PLAN OF CARE
Pt. Receptive to social interaction from peers and has begun to attend some groups.   Problem: Ineffective Coping  Goal: Participates in unit activities  Description: Interventions:  - Provide therapeutic environment   - Provide required programming   - Redirect inappropriate behaviors   Outcome: Progressing

## 2023-12-05 NOTE — PROGRESS NOTES
12/05/23 1457   Team Meeting   Meeting Type Tx Team Meeting   Initial Conference Date 12/05/23   Team Members Present   Team Members Present Physician;Nurse;   Physician Team Member Yris   Nursing Team Member [facility] Management Team Member Erickchantell Martinez   Patient/Family Present   Patient Present Yes   Patient's Family Present No     Tx plan was reviewed and discussed with Pt. Pt was encouraged to attend groups. Medication was discussed with Pt. Pt signed tx plan.

## 2023-12-05 NOTE — NURSING NOTE
Pt admitted from [facility] ED as a 201. Pt reports SI to jump in front of a bus due to "mental breakdown" caused by chronic pain and being unable to console grandson after his dog . Pt reports chronic back pain. Pt reports she was told they have to do testing before surgery but her appointment will not be until February. Pt states " I can't wait that long." Pt denies AH/VH/VI and SI at this time.

## 2023-12-05 NOTE — NURSING NOTE
Patient c/o 10/10 low back pain. She states "I have chronic low back pain. I'm supposed to be getting a lumbar fusion." PRN Motrin 600 mg administered at 0531.

## 2023-12-05 NOTE — ASSESSMENT & PLAN NOTE
Lab Results   Component Value Date    HGBA1C 13.6 (H) 02/08/2023       Recent Labs     12/04/23 2139 12/05/23  1221   POCGLU 162* 293*       Blood Sugar Average: Last 72 hrs:  (P) 227.5  Patient will continue on her home dose of Lantus 20 units SQ nightly  Patient will have 4 times daily blood sugars with sliding scale insulin  Patient will have an A1c added to her labs

## 2023-12-05 NOTE — PROGRESS NOTES
12/05/23 1030 12/05/23 1100 12/05/23 1300   Activity/Group Checklist   Group Community meeting Other (Comment)  ( Recovery: wellness and do's) Pet therapy   Attendance Did not attend Did not attend Attended   Attendance Duration (min)  --   --  0-15   Interactions  --   --  Interacted appropriately   Affect/Mood  --   --  Appropriate;Normal range   Goals Achieved  --   --  Able to engage in interactions

## 2023-12-05 NOTE — TREATMENT TEAM
12/05/23 0837   Pain Assessment   Pain Assessment Tool 0-10   Pain Score 10 - Worst Possible Pain   Pain Location/Orientation Orientation: Right;Orientation: Lower; Location: Back   Pain Radiating Towards legs   Pain Onset/Description Onset: Ongoing;Frequency: Constant/Continuous   Effect of Pain on Daily Activities limits mobility   Patient's Stated Pain Goal No pain   Hospital Pain Intervention(s) Medication (See MAR)     Patient approached this writer and requested medication for severe pain. Patient was offered and accepted Tylenol 650 MG PO. Will reassess.

## 2023-12-05 NOTE — ASSESSMENT & PLAN NOTE
Patient has a longstanding history of tobacco abuse greater than 1 year smokes at least 1 pack/day  Patient will use a nicotine patch for nicotine replacement therapy  Smoking cessation education

## 2023-12-05 NOTE — ASSESSMENT & PLAN NOTE
Patient has been in complete recovery since 10 of 2023  Is having coping issues with her back pain as well is affecting her mental wellbeing

## 2023-12-05 NOTE — ASSESSMENT & PLAN NOTE
Vital signs stable afebrile patient seen and examined by myself Labs from today 12/5/2023 sodium 137 potassium 4.3 creatinine 0.51 vitamin B12 369  Patient medically stable for admit  SL IM will sign off please call with any questions or concerns

## 2023-12-05 NOTE — TREATMENT PLAN
TREATMENT PLAN REVIEW - [address] [facility] 52 y.o. 1970 female MRN: [id number]    [facility] Room / Bed: [place] [address]/OABHU 772-45 Encounter: [id number]          Admit Date/Time:  12/4/2023  7:44 PM    Treatment Team: Attending Provider: Stuart Fernandez DO; Patient Care Assistant: Geri Slater; Patient Care Technician: Sergio Ham;  Patient Care Assistant: Erica Gallagher; Registered Nurse: Chuy Brian RN; Occupational Therapy Assistant: Ebony Pierre; Nurse Manager: Frankie Nava RN    Diagnosis: Principal Problem:    Severe episode of recurrent major depressive disorder, without psychotic features ([address])  Active Problems:    Medical clearance for psychiatric admission    Intractable low back pain    Type 2 diabetes mellitus with hemoglobin A1c goal of less than 7.0% ([address])    Vitamin B12 deficiency    Methamphetamine abuse in remission ([address])    Tobacco abuse    Opioid use disorder      Patient Strengths/Assets: family ties, good support system, motivation for treatment/growth, negotiates basic needs, patient is on a voluntary commitment, resourceful    Patient Barriers/Limitations: homeless, poor physical health, substance use    Short Term Goals: decrease in depressive symptoms, decrease in anxiety symptoms, decrease in suicidal thoughts, mood stabilization    Long Term Goals: improvement in depression, improvement in anxiety, stabilization of mood, free of suicidal thoughts    Progress Towards Goals: starting psychiatric medications as prescribed    Recommended Treatment: medication management, patient medication education, group therapy, milieu therapy, continued Behavioral Health psychiatric evaluation/assessment process    Treatment Frequency: daily medication monitoring, group and milieu therapy daily, monitoring through interdisciplinary rounds, monitoring through weekly patient care conferences    Expected Discharge Date:  5-7 midnights    Discharge Plan: referrals as indicated    Treatment Plan Created/Updated By: Loida Shafer DO
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
## 2023-12-05 NOTE — CONSULTS
[facility]  Consult  Name: Lennox Vargas 52 y.o. female I MRN: [id number]  Unit/Bed#: Maya Medley 771-82 I Date of Admission: 12/4/2023   Date of Service: 12/5/2023 I Hospital Day: 1    Inpatient consult for Medical Clearance for [facility] patient  Consult performed by: KENNETH Perdomo  Consult ordered by: KENNETH Stubbs          Assessment/Plan   Medical clearance for psychiatric admission  Assessment & Plan  Vital signs stable afebrile patient seen and examined by myself Labs from today 12/5/2023 sodium 137 potassium 4.3 creatinine 0.51 vitamin B12 369  Patient medically stable for admit  SL IM will sign off please call with any questions or concerns    Tobacco abuse  Assessment & Plan  Patient has a longstanding history of tobacco abuse greater than 1 year smokes at least 1 pack/day  Patient will use a nicotine patch for nicotine replacement therapy  Smoking cessation education    Methamphetamine abuse in remission [facility])  Assessment & Plan  Patient has been in complete recovery since 10 of 2023  Is having coping issues with her back pain as well is affecting her mental wellbeing    Vitamin B12 deficiency  Assessment & Plan  Patient's vitamin B12 level today was 369  Patient will get an IM injection of vitamin B12 1000 mcg x 1  Patient will start tomorrow on 12 6 on vitamin B12 1000 mcg p.o. daily    Type 2 diabetes mellitus with hemoglobin A1c goal of less than 7.0% [facility])  Assessment & Plan  Lab Results   Component Value Date    HGBA1C 13.6 (H) 02/08/2023       Recent Labs     12/04/23  2139 12/05/23  1221   POCGLU 162* 293*       Blood Sugar Average: Last 72 hrs:  (P) 227.5  Patient will continue on her home dose of Lantus 20 units SQ nightly  Patient will have 4 times daily blood sugars with sliding scale insulin  Patient will have an A1c added to her labs    Intractable low back pain  Assessment & Plan  Patient has longstanding chronic back pain and is in the process of being worked up to have a spinal fusion  Patient will continue on her home regiment of pain medicine which is as follows  Tylenol 975 p.o. every 6 hours standing  Motrin 600 mg every 8 hours standing  Robaxin 750 mg every 8 hours standing  Mobic 15 mg p.o. daily  Neurontin 600 mg p.o. 3 times daily  Lidoderm patch to her right lower back and buttocks area region daily  Psych will also input as to further pain management        Counseling / Coordination of Care Time: 45 minutes. Greater than 50% of total time spent on patient counseling and coordination of care. Collaboration of Care: Were Recommendations Directly Discussed with Primary Treatment Team? - No     History of Present Illness:    Araseli Roberson is a 52 y.o. female who is originally admitted to the psychiatry service due to [place] wanting to jump in front of a bus. We are consulted for medical clearance for admission to [address] [place] [address] and treatment of underlying psychiatric illness. [facility] [address] [place], [place] [address] after having wanting to jump in front of a bus to end her life. She has irretractable back pain and is a recent newly recovered meth amphetamine addict who has had a mental breakdown. Past medical history methamphetamine use insulin-dependent diabetes mellitus type 2 spinal stenosis back pain tobacco abuse. Review of Systems:    Review of Systems   Constitutional:  Negative for chills and fever. HENT:  Negative for ear pain and sore throat. Eyes:  Negative for pain and visual disturbance. Respiratory:  Positive for shortness of breath. Negative for cough. Cardiovascular:  Negative for chest pain and palpitations. Gastrointestinal:  Negative for abdominal pain and vomiting. Genitourinary:  Negative for dysuria and hematuria. Musculoskeletal:  Positive for back pain and gait problem. Negative for arthralgias. Skin:  Negative for color change and rash.    Neurological:  Negative for seizures and syncope. Psychiatric/Behavioral:  Positive for dysphoric mood, sleep disturbance and suicidal ideas. The patient is nervous/anxious. All other systems reviewed and are negative. Past Medical and Surgical History:     Past Medical History:   Diagnosis Date    Alcohol abuse     Chronic pain disorder     Diabetes mellitus ([address])     Hypercholesteremia     Medical clearance for psychiatric admission 12/05/2023            No past surgical history on file. Meds/Allergies:    all medications and allergies reviewed    Allergies: No Known Allergies    Social History:     Marital Status: Single    Substance Use History:   Social History     Substance and Sexual Activity   Alcohol Use Not Currently     Social History     Tobacco Use   Smoking Status Every Day    Packs/day: 0.50    Types: Cigarettes   Smokeless Tobacco Former     Social History     Substance and Sexual Activity   Drug Use Not Currently       Family History:    non-contributory    Physical Exam:     Vitals:   Blood Pressure: 138/77 (12/05/23 0737)  Pulse: 92 (12/05/23 0737)  Temperature: (!) 97.3 °F (36.3 °C) (12/05/23 0737)  Temp Source: Temporal (12/05/23 0737)  Respirations: 20 (12/05/23 0737)  Height: 5' 2" (157.5 cm) (12/04/23 1957)  Weight - Scale: 56.7 kg (125 lb) (12/04/23 1957)  SpO2: 93 % (12/05/23 0737)    Physical Exam  Constitutional:       General: She is not in acute distress. Appearance: Normal appearance. She is not ill-appearing. HENT:      Head: Normocephalic and atraumatic. Nose: Nose normal.      Mouth/Throat:      Mouth: Mucous membranes are moist.   Eyes:      Extraocular Movements: Extraocular movements intact. Cardiovascular:      Rate and Rhythm: Normal rate and regular rhythm. Heart sounds: Normal heart sounds. Pulmonary:      Effort: Pulmonary effort is normal.      Breath sounds: Normal breath sounds. No wheezing. Abdominal:      General: Abdomen is flat.  Bowel sounds are normal.      Palpations: Abdomen is soft. Skin:     General: Skin is warm and dry. Neurological:      General: No focal deficit present. Mental Status: She is alert and oriented to person, place, and time. Additional Data:     Lab Results: I have personally reviewed pertinent reports. Results from last 7 days   Lab Units 12/05/23  0536   WBC Thousand/uL 7.34   HEMOGLOBIN g/dL 14.0   HEMATOCRIT % 42.9   PLATELETS Thousands/uL 257   NEUTROS PCT % 63   LYMPHS PCT % 28   MONOS PCT % 7   EOS PCT % 2     Results from last 7 days   Lab Units 12/05/23  0536   SODIUM mmol/L 137   POTASSIUM mmol/L 4.3   CHLORIDE mmol/L 101   CO2 mmol/L 28   BUN mg/dL 17   CREATININE mg/dL 0.51*   ANION GAP mmol/L 8   CALCIUM mg/dL 9.4   ALBUMIN g/dL 4.3   TOTAL BILIRUBIN mg/dL 0.47   ALK PHOS U/L 56   ALT U/L 23   AST U/L 21   GLUCOSE RANDOM mg/dL 132             Lab Results   Component Value Date/Time    HGBA1C 13.6 (H) 02/08/2023 12:14 PM    HGBA1C 12.8 (H) 10/24/2022 01:13 PM    HGBA1C 10.3 (H) 11/30/2020 11:39 AM     Results from last 7 days   Lab Units 12/05/23  1221 12/04/23  2139   POC GLUCOSE mg/dl 293* 162*       EKG, Pathology, and Other Studies Reviewed on Admission:   EKG 12/4/2023 twelve-lead EKG reviewed by myself as well as interpreted by myself ventricular rate 94 QT interval 398 QTc 497 sinus rhythm with occasional PVCs patient has an EKG from 2/15/2021 two-point compare there are no new acute EKG findings. ** Please Note: This note has been constructed using a voice recognition system.  **    I have spent a total time of 45 minutes on 12/05/23 in caring for this patient including Diagnostic results, Prognosis, Risks and benefits of tx options, Instructions for management, Patient and family education, Importance of tx compliance, Risk factor reductions, Impressions, Counseling / Coordination of care, Documenting in the medical record, Reviewing / ordering tests, medicine, procedures  , Obtaining or reviewing history  , and Communicating with other healthcare professionals .

## 2023-12-05 NOTE — ASSESSMENT & PLAN NOTE
Patient's vitamin B12 level today was 369  Patient will get an IM injection of vitamin B12 1000 mcg x 1  Patient will start tomorrow on 12 6 on vitamin B12 1000 mcg p.o. daily

## 2023-12-05 NOTE — ED NOTES
Insurance Authorization for admission:   Phone call placed to [facility]. Phone number: [phone]. Spoke to Dietrich.     5 days approved. Level of care: 201. Review on 12/8/2023. Authorization # [id number].

## 2023-12-05 NOTE — PLAN OF CARE
Problem: Risk for Self Injury/Neglect  Goal: Treatment Goal: Remain safe during length of stay, learn and adopt new coping skills, and be free of self-injurious ideation, impulses and acts at the time of discharge  Outcome: Progressing  Goal: Refrain from harming self  Description: Interventions:  - Monitor patient closely, per order  - Develop a trusting relationship  - Supervise medication ingestion, monitor effects and side effects   Outcome: Progressing     Problem: Risk for Self Injury/Neglect  Goal: Recognize maladaptive responses and adopt new coping mechanisms  Outcome: Not Progressing     Problem: Depression  Goal: Treatment Goal: Demonstrate behavioral control of depressive symptoms, verbalize feelings of improved mood/affect, and adopt new coping skills prior to discharge  Outcome: Not Progressing     Problem: Anxiety  Goal: Anxiety is at manageable level  Description: Interventions:  - Assess and monitor patient's anxiety level. - Monitor for signs and symptoms (heart palpitations, chest pain, shortness of breath, headaches, nausea, feeling jumpy, restlessness, irritable, apprehensive). - Collaborate with interdisciplinary team and initiate plan and interventions as ordered.   - [place] patient to unit/surroundings  - Explain treatment plan  - Encourage participation in care  - Encourage verbalization of concerns/fears  - Identify coping mechanisms  - Assist in developing anxiety-reducing skills  - Administer/offer alternative therapies  - Limit or eliminate stimulants  Outcome: Not Progressing     Problem: Ineffective Coping  Goal: Identifies ineffective coping skills  Outcome: Not Progressing  Goal: Identifies healthy coping skills  Outcome: Not Progressing

## 2023-12-05 NOTE — TREATMENT TEAM
12/05/23 0937   Pain Assessment Post Intervention   Pain Assessment Tool Used: 0-10   Post Intervention Pain Score 5   Post Intervention Pain Location/Orientation Orientation: Right;Orientation: Lower; Location: Back   Post Intervention POSS S   Response to Interventions effective at reducing pain     Patient reports a decrease in pain after taking Tylenol 650 MG PO. Medication effective in reduction of pain.

## 2023-12-05 NOTE — NURSING NOTE
Patient is visible on the unit and social with peers and staff. Earlier in the day, patient was tearful and reported that she is tired of living in constant pain. Patient tells this writer that [place] 2 [place], contemplating jumping in front of a bus. She states that she didn't do it because she didn't want her grandchildren to think she was a loser. Patient also reports that the family dog was hit and run. She states this really put her over the edge. Patient endorses SI, anxiety, and depression. Patient denies HI, A/VH. Patient contracts for safety while here. Later in the day, patient was bright on approach and sitting with peers coloring and talking. Appetite is good. Attended 2/2 groups.

## 2023-12-05 NOTE — ASSESSMENT & PLAN NOTE
Patient has longstanding chronic back pain and is in the process of being worked up to have a spinal fusion  Patient will continue on her home regiment of pain medicine which is as follows  Tylenol 975 p.o. every 6 hours standing  Motrin 600 mg every 8 hours standing  Robaxin 750 mg every 8 hours standing  Mobic 15 mg p.o. daily  Neurontin 600 mg p.o. 3 times daily  Lidoderm patch to her right lower back and buttocks area region daily  Psych will also input as to further pain management

## 2023-12-05 NOTE — TREATMENT TEAM
12/05/23 0837   Perales Anxiety Scale   Anxious Mood 3   Tension 3   Fears 3   Insomnia 1   Intellectual 2   Depressed Mood 3   Somatic Complaints: Muscular 2   Somatic Complaints: Sensory 0   Cardiovascular Symptoms 0   Respiratory Symptoms 0   Gastrointestinal Symptoms 0   Genitourinary Symptoms 0   Autonomic Symptoms 0   Behavior at Interview 3   Perales Anxiety Score 20     Patient requested medication for anxiety. Patient visibly upset, crying, shaking, irritable. Patient unable to use a coping skill at this time. Patient was offered and accepted Atarax 50 MG PO. Will reassess.
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
## 2023-12-06 LAB
GLUCOSE SERPL-MCNC: 136 MG/DL (ref 65–140)
GLUCOSE SERPL-MCNC: 164 MG/DL (ref 65–140)
GLUCOSE SERPL-MCNC: 166 MG/DL (ref 65–140)
GLUCOSE SERPL-MCNC: 311 MG/DL (ref 65–140)

## 2023-12-06 PROCEDURE — 82948 REAGENT STRIP/BLOOD GLUCOSE: CPT

## 2023-12-06 PROCEDURE — 99232 SBSQ HOSP IP/OBS MODERATE 35: CPT | Performed by: STUDENT IN AN ORGANIZED HEALTH CARE EDUCATION/TRAINING PROGRAM

## 2023-12-06 RX ADMIN — METFORMIN HYDROCHLORIDE 1000 MG: 500 TABLET, FILM COATED ORAL at 08:50

## 2023-12-06 RX ADMIN — INSULIN LISPRO 1 UNITS: 100 INJECTION, SOLUTION INTRAVENOUS; SUBCUTANEOUS at 08:55

## 2023-12-06 RX ADMIN — INSULIN GLARGINE 20 UNITS: 100 INJECTION, SOLUTION SUBCUTANEOUS at 21:31

## 2023-12-06 RX ADMIN — ACETAMINOPHEN 975 MG: 325 TABLET ORAL at 12:24

## 2023-12-06 RX ADMIN — ACETAMINOPHEN 975 MG: 325 TABLET ORAL at 17:14

## 2023-12-06 RX ADMIN — IBUPROFEN 600 MG: 600 TABLET ORAL at 05:51

## 2023-12-06 RX ADMIN — METHOCARBAMOL 750 MG: 750 TABLET, FILM COATED ORAL at 14:37

## 2023-12-06 RX ADMIN — TRAZODONE HYDROCHLORIDE 50 MG: 50 TABLET ORAL at 21:41

## 2023-12-06 RX ADMIN — HYDROXYZINE HYDROCHLORIDE 50 MG: 50 TABLET, FILM COATED ORAL at 10:52

## 2023-12-06 RX ADMIN — METHOCARBAMOL 750 MG: 750 TABLET, FILM COATED ORAL at 05:51

## 2023-12-06 RX ADMIN — IBUPROFEN 600 MG: 600 TABLET ORAL at 21:32

## 2023-12-06 RX ADMIN — CYANOCOBALAMIN TAB 1000 MCG 1000 MCG: 1000 TAB at 08:50

## 2023-12-06 RX ADMIN — IBUPROFEN 600 MG: 600 TABLET ORAL at 14:37

## 2023-12-06 RX ADMIN — MELOXICAM 7.5 MG: 15 TABLET ORAL at 08:50

## 2023-12-06 RX ADMIN — ARIPIPRAZOLE 2 MG: 2 TABLET ORAL at 08:49

## 2023-12-06 RX ADMIN — GABAPENTIN 600 MG: 300 CAPSULE ORAL at 08:49

## 2023-12-06 RX ADMIN — NICOTINE 1 PATCH: 21 PATCH, EXTENDED RELEASE TRANSDERMAL at 09:01

## 2023-12-06 RX ADMIN — LIDOCAINE 1 PATCH: 700 PATCH TOPICAL at 08:58

## 2023-12-06 RX ADMIN — METHOCARBAMOL 750 MG: 750 TABLET, FILM COATED ORAL at 21:32

## 2023-12-06 RX ADMIN — GABAPENTIN 600 MG: 300 CAPSULE ORAL at 17:15

## 2023-12-06 RX ADMIN — INSULIN LISPRO 3 UNITS: 100 INJECTION, SOLUTION INTRAVENOUS; SUBCUTANEOUS at 17:13

## 2023-12-06 RX ADMIN — DULOXETINE HYDROCHLORIDE 90 MG: 30 CAPSULE, DELAYED RELEASE ORAL at 08:50

## 2023-12-06 RX ADMIN — MELATONIN TAB 3 MG 3 MG: 3 TAB at 21:32

## 2023-12-06 RX ADMIN — GABAPENTIN 600 MG: 300 CAPSULE ORAL at 21:32

## 2023-12-06 RX ADMIN — METFORMIN HYDROCHLORIDE 1000 MG: 500 TABLET, FILM COATED ORAL at 17:14

## 2023-12-06 NOTE — PROGRESS NOTES
12/06/23 1030 12/06/23 1330   Activity/Group Checklist   Group [employer] meeting Life Skills   Attendance Attended Did not attend   Attendance Duration (min) 16-30  --    Interactions Interacted appropriately  --    Affect/Mood Appropriate;Normal range  --    Goals Achieved Able to listen to others; Able to engage in interactions  --

## 2023-12-06 NOTE — NURSING NOTE
Patient is visible socializing with selected peers in dinning room throughout the shift. Patient is social and pleasant to staff and peers. Patient reports minimal anxiety. Patient expresses "going throughout a lot. I am homeless and unemployed". Patient is currently denying depression, SI/HI/AVH at this time. Patient is medications complaint. Patient appears motivated and brighten slightly on approach. Able to make needs known.

## 2023-12-06 NOTE — PROGRESS NOTES
Progress Note - [facility] 52 y.o. female MRN: [id number]  Unit/Bed#: Maya Medley 280-01 Encounter: [id number]    Assessment:  Principal Problem:    Severe episode of recurrent major depressive disorder, without psychotic features ([facility])  Active Problems:    Medical clearance for psychiatric admission    Intractable low back pain    Type 2 diabetes mellitus with hemoglobin A1c goal of less than 7.0% ([facility])    Vitamin B12 deficiency    Methamphetamine abuse in remission ([facility])    Tobacco abuse    Opioid use disorder      Plan:  --Continue with psychiatric hospitalization  --Continue with individual, group, and milieu therapy  --Continue the following medications:  Current Facility-Administered Medications   Medication Dose Route Frequency    acetaminophen (TYLENOL) tablet 975 mg  975 mg Oral Q6H BRITTANI    albuterol (PROVENTIL HFA,VENTOLIN HFA) inhaler 2 puff  2 puff Inhalation Q4H PRN    aluminum-magnesium hydroxide-simethicone (MAALOX) oral suspension 30 mL  30 mL Oral Q4H PRN    ARIPiprazole (ABILIFY) tablet 2 mg  2 mg Oral Daily    benztropine (COGENTIN) injection 1 mg  1 mg Intramuscular Q4H PRN Max 6/day    benztropine (COGENTIN) tablet 0.5 mg  0.5 mg Oral Q4H PRN Max 6/day    bisacodyl (DULCOLAX) rectal suppository 10 mg  10 mg Rectal Daily PRN    cyanocobalamin (VITAMIN B-12) tablet 1,000 mcg  1,000 mcg Oral Daily    cyanocobalamin injection 1,000 mcg  1,000 mcg Intramuscular Q30 Days    DULoxetine (CYMBALTA) delayed release capsule 90 mg  90 mg Oral Daily    gabapentin (NEURONTIN) capsule 600 mg  600 mg Oral TID    glycerin-hypromellose- (ARTIFICIAL TEARS) ophthalmic solution 1 drop  1 drop Both Eyes Q3H PRN    hydrOXYzine HCL (ATARAX) tablet 25 mg  25 mg Oral Q6H PRN Max 4/day    hydrOXYzine HCL (ATARAX) tablet 50 mg  50 mg Oral Q6H PRN Max 4/day    ibuprofen (MOTRIN) tablet 600 mg  600 mg Oral Q8H [address]    insulin glargine (LANTUS) subcutaneous injection 20 Units 0.2 mL  20 Units Subcutaneous HS    insulin lispro (HumaLOG) 100 units/mL subcutaneous injection 1-5 Units  1-5 Units Subcutaneous TID AC    insulin lispro (HumaLOG) 100 units/mL subcutaneous injection 1-5 Units  1-5 Units Subcutaneous HS    lidocaine (LIDODERM) 5 % patch 1 patch  1 patch Topical Daily    LORazepam (ATIVAN) injection 1 mg  1 mg Intramuscular Q6H PRN Max 3/day    LORazepam (ATIVAN) tablet 1 mg  1 mg Oral Q6H PRN Max 3/day    melatonin tablet 3 mg  3 mg Oral HS    meloxicam (MOBIC) tablet 7.5 mg  7.5 mg Oral Daily    metFORMIN (GLUCOPHAGE) tablet 1,000 mg  1,000 mg Oral BID With Meals    methocarbamol (ROBAXIN) tablet 750 mg  750 mg Oral Q8H [address]    nicotine (NICODERM CQ) 21 mg/24 hr TD 24 hr patch 1 patch  1 patch Transdermal Daily    OLANZapine (ZyPREXA) IM injection 5 mg  5 mg Intramuscular Q3H PRN Max 3/day    OLANZapine (ZyPREXA) tablet 2.5 mg  2.5 mg Oral Q4H PRN Max 6/day    OLANZapine (ZyPREXA) tablet 5 mg  5 mg Oral Q4H PRN Max 3/day    OLANZapine (ZyPREXA) tablet 5 mg  5 mg Oral Q3H PRN Max 3/day    polyethylene glycol (MIRALAX) packet 17 g  17 g Oral Daily PRN    propranolol (INDERAL) tablet 5 mg  5 mg Oral Q8H PRN    senna-docusate sodium (SENOKOT S) 8.6-50 mg per tablet 1 tablet  1 tablet Oral Daily PRN    traZODone (DESYREL) tablet 50 mg  50 mg Oral HS PRN       Subjective: Patient was seen for continuation of care. Chart was reviewed and discussed with treatment team.     No acute behavioral events over the past 24 hours. Today, patient was seen and examined at bedside for continuation of care. Patient denied adverse effects to their psychiatric medication regimen. Patient denied other new or worsening psychiatric symptoms/complaints at this time. Discussed the importance of continuing to take medications as prescribed, as well as the importance of continuing to attend groups on the unit.   Patient complains of continued feelings of insomnia but does admit that her sleep has improved since initiation of Abilify and that her racing thoughts have also decreased. She still notes some feelings of anxiety. Staff notes the patient is involved in groups and received Atarax 50 mg yesterday for anxiety.       Psychiatric Review of Systems:  Medication adverse effects: none  Sleep: unchanged  Appetite: unchanged  Behavior over the past 24 hours: as per above    Vitals:  Vitals:    12/06/23 0736   BP: 138/78   Pulse: 89   Resp: 17   Temp: (!) 97.2 °F (36.2 °C)   SpO2: 95%       Laboratory results:    I have personally reviewed all pertinent laboratory/tests results  Recent Results (from the past 48 hour(s))   ECG 12 lead    Collection Time: 12/04/23  1:39 PM   Result Value Ref Range    Ventricular Rate 94 BPM    Atrial Rate 94 BPM    MI Interval 176 ms    QRSD Interval 88 ms    QT Interval 398 ms    QTC Interval 497 ms    P [place] 64 degrees    QRS Axis 15 degrees    T Wave Axis 68 degrees   CBC and differential    Collection Time: 12/04/23  1:43 PM   Result Value Ref Range    WBC 4.79 4.31 - 10.16 Thousand/uL    RBC 4.46 3.81 - 5.12 Million/uL    Hemoglobin 12.8 11.5 - 15.4 g/dL    Hematocrit 39.6 34.8 - 46.1 %    MCV 89 82 - 98 fL    MCH 28.7 26.8 - 34.3 pg    MCHC 32.3 31.4 - 37.4 g/dL    RDW 12.5 11.6 - 15.1 %    MPV 11.6 8.9 - 12.7 fL    Platelets 640 483 - 893 Thousands/uL    nRBC 0 /100 WBCs    Neutrophils Relative 46 43 - 75 %    Immat GRANS % 0 0 - 2 %    Lymphocytes Relative 38 14 - 44 %    Monocytes Relative 12 4 - 12 %    Eosinophils Relative 4 0 - 6 %    Basophils Relative 0 0 - 1 %    Neutrophils Absolute 2.18 1.85 - 7.62 Thousands/µL    Immature Grans Absolute 0.01 0.00 - 0.20 Thousand/uL    Lymphocytes Absolute 1.83 0.60 - 4.47 Thousands/µL    Monocytes Absolute 0.58 0.17 - 1.22 Thousand/µL    Eosinophils Absolute 0.17 0.00 - 0.61 Thousand/µL    Basophils Absolute 0.02 0.00 - 0.10 Thousands/µL   Comprehensive metabolic panel    Collection Time: 12/04/23  1:43 PM   Result Value Ref Range    Sodium 137 135 - 147 mmol/L    Potassium 3.8 3.5 - 5.3 mmol/L    Chloride 100 96 - 108 mmol/L    CO2 29 21 - 32 mmol/L    ANION GAP 8 mmol/L    BUN 18 5 - 25 mg/dL    Creatinine 0.61 0.60 - 1.30 mg/dL    Glucose 188 (H) 65 - 140 mg/dL    Calcium 9.2 8.4 - 10.2 mg/dL    AST 28 13 - 39 U/L    ALT 27 7 - 52 U/L    Alkaline Phosphatase 61 34 - 104 U/L    Total Protein 6.8 6.4 - 8.4 g/dL    Albumin 4.3 3.5 - 5.0 g/dL    Total Bilirubin 0.32 0.20 - 1.00 mg/dL    eGFR 102 ml/min/1.73sq m   TSH    Collection Time: 12/04/23  1:43 PM   Result Value Ref Range    TSH 3RD GENERATON 0.692 0.450 - 4.500 uIU/mL   Fingerstick Glucose (POCT)    Collection Time: 12/04/23  9:39 PM   Result Value Ref Range    POC Glucose 162 (H) 65 - 140 mg/dl   Comprehensive metabolic panel    Collection Time: 12/05/23  5:36 AM   Result Value Ref Range    Sodium 137 135 - 147 mmol/L    Potassium 4.3 3.5 - 5.3 mmol/L    Chloride 101 96 - 108 mmol/L    CO2 28 21 - 32 mmol/L    ANION GAP 8 mmol/L    BUN 17 5 - 25 mg/dL    Creatinine 0.51 (L) 0.60 - 1.30 mg/dL    Glucose 132 65 - 140 mg/dL    Glucose, Fasting 132 (H) 65 - 99 mg/dL    Calcium 9.4 8.4 - 10.2 mg/dL    AST 21 13 - 39 U/L    ALT 23 7 - 52 U/L    Alkaline Phosphatase 56 34 - 104 U/L    Total Protein 6.5 6.4 - 8.4 g/dL    Albumin 4.3 3.5 - 5.0 g/dL    Total Bilirubin 0.47 0.20 - 1.00 mg/dL    eGFR 108 ml/min/1.73sq m   Magnesium    Collection Time: 12/05/23  5:36 AM   Result Value Ref Range    Magnesium 1.9 1.9 - 2.7 mg/dL   CBC and differential    Collection Time: 12/05/23  5:36 AM   Result Value Ref Range    WBC 7.34 4.31 - 10.16 Thousand/uL    RBC 4.96 3.81 - 5.12 Million/uL    Hemoglobin 14.0 11.5 - 15.4 g/dL    Hematocrit 42.9 34.8 - 46.1 %    MCV 87 82 - 98 fL    MCH 28.2 26.8 - 34.3 pg    MCHC 32.6 31.4 - 37.4 g/dL    RDW 12.7 11.6 - 15.1 %    MPV 11.1 8.9 - 12.7 fL    Platelets 312 326 - 403 Thousands/uL    nRBC 0 /100 WBCs    Neutrophils Relative 63 43 - 75 %    Immat GRANS % 0 0 - 2 %    Lymphocytes Relative 28 14 - 44 %    Monocytes Relative 7 4 - 12 %    Eosinophils Relative 2 0 - 6 %    Basophils Relative 0 0 - 1 %    Neutrophils Absolute 4.60 1.85 - 7.62 Thousands/µL    Immature Grans Absolute 0.02 0.00 - 0.20 Thousand/uL    Lymphocytes Absolute 2.03 0.60 - 4.47 Thousands/µL    Monocytes Absolute 0.52 0.17 - 1.22 Thousand/µL    Eosinophils Absolute 0.15 0.00 - 0.61 Thousand/µL    Basophils Absolute 0.02 0.00 - 0.10 Thousands/µL   TSH, 3rd generation with Free T4 reflex    Collection Time: 12/05/23  5:36 AM   Result Value Ref Range    TSH 3RD GENERATON 0.636 0.450 - 4.500 uIU/mL   Vitamin B12    Collection Time: 12/05/23  5:36 AM   Result Value Ref Range    Vitamin B-12 369 180 - 914 pg/mL   Vitamin D 25 hydroxy    Collection Time: 12/05/23  5:36 AM   Result Value Ref Range    Vit D, 25-Hydroxy 42.0 30.0 - 100.0 ng/mL   Lipid panel    Collection Time: 12/05/23  5:36 AM   Result Value Ref Range    Cholesterol 160 See Comment mg/dL    Triglycerides 65 See Comment mg/dL    HDL, Direct 64 >=50 mg/dL    LDL Calculated 83 0 - 100 mg/dL    Non-HDL-Chol (CHOL-HDL) 96 mg/dl   Hemoglobin A1C    Collection Time: 12/05/23  5:36 AM   Result Value Ref Range    Hemoglobin A1C 9.8 (H) Normal 4.0-5.6%; PreDiabetic 5.7-6.4%;  Diabetic >=6.5%; Glycemic control for adults with diabetes <7.0% %     mg/dl   Fingerstick Glucose (POCT)    Collection Time: 12/05/23 12:21 PM   Result Value Ref Range    POC Glucose 293 (H) 65 - 140 mg/dl   Fingerstick Glucose (POCT)    Collection Time: 12/05/23  4:20 PM   Result Value Ref Range    POC Glucose 148 (H) 65 - 140 mg/dl   Fingerstick Glucose (POCT)    Collection Time: 12/05/23  8:45 PM   Result Value Ref Range    POC Glucose 148 (H) 65 - 140 mg/dl   Fingerstick Glucose (POCT)    Collection Time: 12/06/23  7:27 AM   Result Value Ref Range    POC Glucose 166 (H) 65 - 140 mg/dl   Fingerstick Glucose (POCT)    Collection Time: 12/06/23 12:04 PM   Result Value Ref Range    POC Glucose 136 65 - 140 mg/dl        Current Medications:  Current medications as per above. All medications have been reviewed. Risks, benefits, alternatives, and possible side effects of patient's psychiatric medications were discussed with patient. Mental Status Evaluation:  Appearance: moderately kempt  Motor: +psychomotor agitation  Behavior: cooperative, pleasant  Speech: normal rate, rhythm, and volume  Mood: "anxious but better"  Affect: mood-congruent, anxious appearing  Thought Process: organized and linear  Thought Content: denies auditory hallucinations, denies visual hallucinations, denies delusions  Risk Potential: denies suicidal ideation, plan, or intent. Denies homicidal ideation  Sensorium: Oriented to person, place, time, and situation  Cognition: cognitive ability appears intact but was not quantitatively tested  Consciousness: alert and awake  Attention: currently intact  Insight: fair  Judgement: fair    Progress Toward Goals & Illness Status: Patient is not at goal. They are not yet ready for discharge. The patient's condition currently requires active psychopharmacological medication management, interdisciplinary coordination with case management, and the utilization of adjunctive milieu and group therapy to augment psychopharmacological efficacy. The patient's risk of morbidity, and progression or decompensation of psychiatric disease, is higher without this current treatment. This note has been constructed using a voice recognition system. There may be translation, syntax, or grammatical errors. If you have any questions, please contact the dictating provider.

## 2023-12-06 NOTE — PLAN OF CARE
Problem: Risk for Self Injury/Neglect  Goal: Treatment Goal: Remain safe during length of stay, learn and adopt new coping skills, and be free of self-injurious ideation, impulses and acts at the time of discharge  Outcome: Progressing  Goal: Refrain from harming self  Description: Interventions:  - Monitor patient closely, per order  - Develop a trusting relationship  - Supervise medication ingestion, monitor effects and side effects   Outcome: Progressing  Goal: Recognize maladaptive responses and adopt new coping mechanisms  Outcome: Progressing     Problem: Depression  Goal: Treatment Goal: Demonstrate behavioral control of depressive symptoms, verbalize feelings of improved mood/affect, and adopt new coping skills prior to discharge  Outcome: Progressing     Problem: Anxiety  Goal: Anxiety is at manageable level  Description: Interventions:  - Assess and monitor patient's anxiety level. - Monitor for signs and symptoms (heart palpitations, chest pain, shortness of breath, headaches, nausea, feeling jumpy, restlessness, irritable, apprehensive). - Collaborate with interdisciplinary team and initiate plan and interventions as ordered.   - [place] patient to unit/surroundings  - Explain treatment plan  - Encourage participation in care  - Encourage verbalization of concerns/fears  - Identify coping mechanisms  - Assist in developing anxiety-reducing skills  - Administer/offer alternative therapies  - Limit or eliminate stimulants  Outcome: Progressing

## 2023-12-06 NOTE — PROGRESS NOTES
12/06/23 0858   Team Meeting   Meeting Type Daily Rounds   Initial Conference Date 12/06/23   Team Members Present   Team Members Present Physician;Nurse;;; Occupational Therapist   Physician Team Member [address] Team Member [facility] Management Team Member Cody LYNNE Team Member uGlshan Hughes   Patient/Family Present   Patient Present No   Patient's Family Present No     Patient calm and cooperative. Social and communicative. Discharge is pending.

## 2023-12-06 NOTE — TREATMENT TEAM
12/06/23 1044   Perales Anxiety Scale   Anxious Mood 4   Tension 3   Fears 2   Insomnia 0   Intellectual 2   Depressed Mood 3   Somatic Complaints: Muscular 2   Somatic Complaints: Sensory 3   Cardiovascular Symptoms 0   Respiratory Symptoms 0   Gastrointestinal Symptoms 0   Genitourinary Symptoms 0   Autonomic Symptoms 1   Behavior at Interview 3   Perales Anxiety Score 23     Patient stated she was feeling very anxious in the environment  due to patients being loud. Patient stated " everything is closing in on me" administered Atarax 50 mg PO.

## 2023-12-06 NOTE — PROGRESS NOTES
12/06/23 0858   Team Meeting   Meeting Type Daily Rounds   Initial Conference Date 12/06/23   Team Members Present   Team Members Present Physician;Nurse;;; Occupational Therapist   Physician Team Member [address] Team Member [facility] Management Team Member Renata Reynoso   Social Work Team Member  --    OT Team Member Nas Bran   Patient/Family Present   Patient Present No   Patient's Family Present No     Patient calm and cooperative. Still endorsing depression. Irritable and nasty in the day room today. Discharge is pending.

## 2023-12-06 NOTE — TREATMENT TEAM
Pt attended anger management group. Pt able to self express and identify her anger and triggers. Pt expressed that being in recovery she has been working on her anger. Pt acknowledged the work involved with managing anger. Pt notes she gets "sarcastic" when she is angry. Pt able to remain in behavioral control making slow and steady progress towards mh recovery goals. 12/06/23 1100   Activity/Group Checklist   Group Anger management   Attendance Attended   Attendance Duration (min) 31-45   Interactions Interacted appropriately   Affect/Mood Appropriate   Goals Achieved Identified feelings; Identified relapse prevention strategies; Discussed coping strategies; Discussed self-esteem issues; Able to listen to others; Able to engage in interactions; Able to reflect/comment on own behavior;Able to manage/cope with feelings;Verbalized increased hopefulness; Able to self-disclose; Able to recieve feedback

## 2023-12-06 NOTE — NURSING NOTE
Patient is awake and pleasant. Patient attended two groups today. Patient received a PRN for anxiety and that's was effective. Patient is medication compliant and bright on approach. Patient has occasionally isolated to room this shift.

## 2023-12-07 LAB
GLUCOSE SERPL-MCNC: 133 MG/DL (ref 65–140)
GLUCOSE SERPL-MCNC: 157 MG/DL (ref 65–140)
GLUCOSE SERPL-MCNC: 224 MG/DL (ref 65–140)
GLUCOSE SERPL-MCNC: 79 MG/DL (ref 65–140)

## 2023-12-07 PROCEDURE — 99232 SBSQ HOSP IP/OBS MODERATE 35: CPT | Performed by: PSYCHIATRY & NEUROLOGY

## 2023-12-07 PROCEDURE — 82948 REAGENT STRIP/BLOOD GLUCOSE: CPT

## 2023-12-07 RX ORDER — ARIPIPRAZOLE 5 MG/1
5 TABLET ORAL DAILY
Status: DISCONTINUED | OUTPATIENT
Start: 2023-12-08 | End: 2023-12-11

## 2023-12-07 RX ADMIN — METFORMIN HYDROCHLORIDE 1000 MG: 500 TABLET, FILM COATED ORAL at 09:03

## 2023-12-07 RX ADMIN — ACETAMINOPHEN 975 MG: 325 TABLET ORAL at 17:09

## 2023-12-07 RX ADMIN — LIDOCAINE 1 PATCH: 700 PATCH TOPICAL at 09:12

## 2023-12-07 RX ADMIN — METHOCARBAMOL 750 MG: 750 TABLET, FILM COATED ORAL at 13:34

## 2023-12-07 RX ADMIN — MELATONIN TAB 3 MG 3 MG: 3 TAB at 21:11

## 2023-12-07 RX ADMIN — GABAPENTIN 600 MG: 300 CAPSULE ORAL at 09:03

## 2023-12-07 RX ADMIN — TRAZODONE HYDROCHLORIDE 50 MG: 50 TABLET ORAL at 21:25

## 2023-12-07 RX ADMIN — CYANOCOBALAMIN TAB 1000 MCG 1000 MCG: 1000 TAB at 09:03

## 2023-12-07 RX ADMIN — METHOCARBAMOL 750 MG: 750 TABLET, FILM COATED ORAL at 21:11

## 2023-12-07 RX ADMIN — METHOCARBAMOL 750 MG: 750 TABLET, FILM COATED ORAL at 06:08

## 2023-12-07 RX ADMIN — ARIPIPRAZOLE 2 MG: 2 TABLET ORAL at 09:03

## 2023-12-07 RX ADMIN — HYDROXYZINE HYDROCHLORIDE 25 MG: 25 TABLET, FILM COATED ORAL at 09:46

## 2023-12-07 RX ADMIN — INSULIN LISPRO 1 UNITS: 100 INJECTION, SOLUTION INTRAVENOUS; SUBCUTANEOUS at 12:20

## 2023-12-07 RX ADMIN — IBUPROFEN 600 MG: 600 TABLET ORAL at 13:34

## 2023-12-07 RX ADMIN — METFORMIN HYDROCHLORIDE 1000 MG: 500 TABLET, FILM COATED ORAL at 17:09

## 2023-12-07 RX ADMIN — MELOXICAM 7.5 MG: 15 TABLET ORAL at 09:03

## 2023-12-07 RX ADMIN — HYDROXYZINE HYDROCHLORIDE 25 MG: 25 TABLET, FILM COATED ORAL at 17:39

## 2023-12-07 RX ADMIN — NICOTINE 1 PATCH: 21 PATCH, EXTENDED RELEASE TRANSDERMAL at 09:12

## 2023-12-07 RX ADMIN — INSULIN LISPRO 2 UNITS: 100 INJECTION, SOLUTION INTRAVENOUS; SUBCUTANEOUS at 17:10

## 2023-12-07 RX ADMIN — ACETAMINOPHEN 975 MG: 325 TABLET ORAL at 12:20

## 2023-12-07 RX ADMIN — ACETAMINOPHEN 975 MG: 325 TABLET ORAL at 06:08

## 2023-12-07 RX ADMIN — GABAPENTIN 600 MG: 300 CAPSULE ORAL at 21:11

## 2023-12-07 RX ADMIN — GABAPENTIN 600 MG: 300 CAPSULE ORAL at 17:09

## 2023-12-07 RX ADMIN — IBUPROFEN 600 MG: 600 TABLET ORAL at 21:11

## 2023-12-07 RX ADMIN — DULOXETINE HYDROCHLORIDE 90 MG: 30 CAPSULE, DELAYED RELEASE ORAL at 09:02

## 2023-12-07 RX ADMIN — INSULIN GLARGINE 20 UNITS: 100 INJECTION, SOLUTION SUBCUTANEOUS at 21:25

## 2023-12-07 NOTE — PLAN OF CARE
Problem: Risk for Self Injury/Neglect  Goal: Treatment Goal: Remain safe during length of stay, learn and adopt new coping skills, and be free of self-injurious ideation, impulses and acts at the time of discharge  Outcome: Progressing  Goal: Refrain from harming self  Description: Interventions:  - Monitor patient closely, per order  - Develop a trusting relationship  - Supervise medication ingestion, monitor effects and side effects   Outcome: Progressing     Problem: Depression  Goal: Treatment Goal: Demonstrate behavioral control of depressive symptoms, verbalize feelings of improved mood/affect, and adopt new coping skills prior to discharge  Outcome: Progressing     Problem: Anxiety  Goal: Anxiety is at manageable level  Description: Interventions:  - Assess and monitor patient's anxiety level. - Monitor for signs and symptoms (heart palpitations, chest pain, shortness of breath, headaches, nausea, feeling jumpy, restlessness, irritable, apprehensive). - Collaborate with interdisciplinary team and initiate plan and interventions as ordered.   - [place] patient to unit/surroundings  - Explain treatment plan  - Encourage participation in care  - Encourage verbalization of concerns/fears  - Identify coping mechanisms  - Assist in developing anxiety-reducing skills  - Administer/offer alternative therapies  - Limit or eliminate stimulants  Outcome: Progressing

## 2023-12-07 NOTE — SOCIAL WORK
Cm completed phone call to  [facility] Ismael Barroso who was able to share that patient had been at a shelter for some time but was only able to stay for thirty days. She had found her self a motel that was able to pay monthly for ($800) and she continued to return there. She cannot recall the name of it at this time but she has the information in her cell phone. Ismael Barroso will call in time to come and visit.      Jem Jerome ()   [phone] Adilson Gomez

## 2023-12-07 NOTE — TREATMENT TEAM
Pt attended Problem solving and coping skills group. Pt anxious and negative thinking patterns,  Pt did note her co-occurring needs and angry that people judged her prior to her admission. Pt expressed that she is glad she was able to ask for help when needed. Pt hopeful and spoke about bouncing back. 12/07/23 1330   Activity/Group Checklist   Group Other (Comment)   Attendance Attended   Attendance Duration (min) 31-45   Interactions Other (Comment)  (anxious)   Affect/Mood Other (Comment)  (negative thinking)   Goals Achieved Identified feelings; Identified relapse prevention strategies; Able to listen to others; Able to engage in interactions; Able to reflect/comment on own behavior;Able to manage/cope with feelings;Verbalized increased hopefulness; Able to self-disclose; Able to recieve feedback

## 2023-12-07 NOTE — TREATMENT TEAM
12/07/23 0941   Perales Anxiety Scale   Anxious Mood 2   Tension 2   Fears 2   Insomnia 0   Intellectual 2   Depressed Mood 0   Somatic Complaints: Muscular 0   Somatic Complaints: Sensory 3   Cardiovascular Symptoms 0   Respiratory Symptoms 0   Gastrointestinal Symptoms 0   Genitourinary Symptoms 0   Autonomic Symptoms 0   Behavior at Interview 2   Perales Anxiety Score 13     Patient stated she was feeling anxious and the sound of every one in the dining room was like a buzzing. Atarax 25 mg PO.

## 2023-12-07 NOTE — DISCHARGE INSTR - APPOINTMENTS
Tigist Fall or Ana, our Eliseo and Yenny, will be calling you after your discharge, on the phone number that you provided. They will be available as an additional support, if needed. If you wish to speak with one of them, you may contact Tigist Fall at [phone] or Wero Nunez at [phone].

## 2023-12-07 NOTE — DISCHARGE INSTR - OTHER ORDERS
If you are experiencing a mental health emergency, you may call the [address] [place] [address] 24 hours a day, 7 days per week at [phone]. In [facility], call [phone]. HOW TO GET SUBSTANCE ABUSE HELP:  If you or someone you know has a drug or alcohol problem, there is help:  Otto Lia Cervantes,6Th Floor: [address]: [phone]  An assessment is the first step. In addition to those listed there are other programs available in the area but assessment is best to determine an appropriate level of care. If you DO NOT have Medical Assistance (MA) or [employer], an assessment can be scheduled at one of these providers:  [address]  315 [facility], [address] [place] [address]  [phone]   [facility]  1700 [facility],2 And 3 S Floors., [address]  [address] [facility]. [place], 65 [facility]  1900 [facility]  [address] MEJIA [facility]   Step by [address]  [address] [facility]., [address]  [address] [facility].[address]  [phone]     If you [address], an assessment can be scheduled at one of these providers:  [facility]  [facility].[address]  830 [facility]  315 [facility], [address] [place] [address]  150 [facility]  [address] 1113 [facility], 1st Floor, Yrn STONE  [phone]  1 [facility], [facility] 2000 E [facility]  1700 [facility],2 And 3 S Floors., [address]  [address] [facility]. [place], [address] [facility] [address]  [phone]   NET ([address] [facility] [address])  [address] [place], [place] [address] [facility], Yrn STONE  [address]  [address] Mahamed Ochoa 815 [facility], [facility]   Step by [address] 815 [facility], 630 [facility]  [address] [facility]., 815 [facility], 630 [facility]  [address], 350 [facility]  [phone]     If you [facility], an assessment can be scheduled at one of these providers. Please contact these Providers to determine if they are in your network plan:  [facility] D[facility] Intake Unit  [address] 1113 [facility], 1st Floor, Yrn STONE  [facility]  [address] [place] [address],2 And 3 S Floors., [address] [facility] [address], 350 [facility]  [phone] 223 [facility]. [place], 65 [facility]  [phone]   NET ([place])  90 [facility] 1801 [facility], Yrn STONE  [address]  [address], 1515 [facility]  One [address], [facility]    Tx plan was reviewed and discussed with Pt. Pt was encouraged to attend groups. Medication was discussed with Pt. Pt signed tx plan.

## 2023-12-07 NOTE — NURSING NOTE
Pt is visible on the unit and socializing with peers. Pt is pleasant and cooperative. Pt denies anxiety, depression, AH/VH/SI/HI at this time. Pt stated "I've been feeling a lot better" Pt denies any unmet needs at this time.

## 2023-12-07 NOTE — PROGRESS NOTES
12/07/23 1100   Activity/Group Checklist   Group Admission/Discharge  (completed relapse prevention plan.)   Attendance Attended   Attendance Duration (min) 0-15   Interactions Interacted appropriately   Affect/Mood Appropriate;Normal range   Goals Achieved Identified relapse prevention strategies; Able to engage in interactions; Able to listen to others

## 2023-12-07 NOTE — NURSING NOTE
Patient c/o anxiety d/t food tray being late. PRN Atarax 25 mg administered at 1739. Perales score 11.

## 2023-12-07 NOTE — NURSING NOTE
Patient was alert and visible in the dining room during meal. Patient denies depression and SI. Patient is medication compliant. Patient has isolated to room but states its due to pain. Patient states she feels a little better after PRN but continues to feel over stimulated in the community.

## 2023-12-07 NOTE — PROGRESS NOTES
12/07/23 0914   Team Meeting   Meeting Type Daily Rounds   Initial Conference Date 12/07/23   Team Members Present   Team Members Present Physician;Nurse;;; Occupational Therapist   Physician Team Member [facility] Team Member [facility] Management Team Member [facility] [address] Work Team Member Ed   OT Team Member William Chung   Patient/Family Present   Patient Present No   Patient's Family Present No     Patient calm and cooperative but does still endorse anxiety. She is attending groups and is social. Discharge is pending.

## 2023-12-07 NOTE — SOCIAL WORK
Cm reached out to [facility] to set up new intake. Patient set up with an intake appointment on 12/15 @ 9am to begin outpatient services. Facility asked that discharge paperwork is faxed to [phone].

## 2023-12-07 NOTE — PROGRESS NOTES
Progress Note - Behavioral Health   Maxwell Byers 52 y.o. female MRN: [id number]  Unit/Bed#: Eusebio Taveras 619-13 Encounter: [id number]    Assessment/Plan   Principal Problem:    Severe episode of recurrent major depressive disorder, without psychotic features ([address] )  Active Problems:    Medical clearance for psychiatric admission    Intractable low back pain    Type 2 diabetes mellitus with hemoglobin A1c goal of less than 7.0% ([facility], [facility])    Vitamin B12 deficiency    Methamphetamine abuse in remission ([address])    Tobacco abuse    Opioid use disorder      Subjective: Patient was seen, chart was reviewed, and case was discussed with entire team.     Patient seen in room. She is pleasant, and cooperative. Patient reports that she is starting to feel better and appreciates the Abilify. Reports that her racing thoughts have calmed down. She does report some anxiety still with the Atarax is helpful. Patient does get out of the milieu and goes to groups and is interactive with staff and peers. Patient has been medication compliant. Patient does report wanting to be here so that she can take care of herself so that she may help others including her children. She reports that her pain has improved even. She is eating adequately. Patient reports sleeping adequately. Medication compliant            Psychiatric Review of Systems:  Behavior over the last 24 hours:  Improving  Sleep: improving  Appetite: adequate  Medication side effects: none verbalized  Medical ROS: Complete review of systems is negative except as noted above.             Vitals:  Vitals:    12/07/23 0734   BP: 119/67   Pulse: 97   Resp: 16   Temp: (!) 97.3 °F (36.3 °C)   SpO2: 93%       Mental Status Exam:    Appearance:  alert, good eye contact, appears stated age, casually dressed, and appropriate grooming and hygiene   Behavior:  calm, cooperative, and pleasant   Speech:  spontaneous, normal rate, normal volume, and coherent talkative   Mood:  Improving depression and anxiety   Affect:  brighter   Thought Process:  goal directed, circumstantial,     Thought Content:  Less negative today   Perceptual Disturbances: no reported hallucinations and does not appear to be responding to internal stimuli at this time   Risk Potential: Suicidal ideation - None at present  Homicidal ideation - None at present  Potential for aggression - Not at present   Sensorium:  oriented to person, place, and time/date   Memory:  Improving   Consciousness:  alert and awake   Attention/Concentration: attention span and concentration appear shorter than expected for age   Insight:  improving   Judgment: improving   Gait/Station: normal gait/station, normal balance   Motor Activity: no abnormal movements     Progress Toward Goals: Progressing    Recommended Treatment: Continue with pharmacotherapy, group therapy, milieu therapy and occupational therapy. Continue frequent safety checks and vitals per unit protocol. Continue with medical management as indicated. Continue coordinating with case management regarding disposition  1. Will increase Abilify to 5 mg starting tomorrow.   2.  Discharge planning      Risks, benefits and possible side effects of Medications: Risks, benefits, and possible side effects of medications have been explained to the patient, who verbalizes understanding      Current Medications:  Current Facility-Administered Medications   Medication Dose Route Frequency Provider Last Rate    acetaminophen  975 mg Oral Q6H [address] KENNETH Lawrence      albuterol  2 puff Inhalation Q4H PRN KENNETH Lawrence      aluminum-magnesium hydroxide-simethicone  30 mL Oral Q4H PRN KENNETH Hurley      ARIPiprazole  2 mg Oral Daily Lopez Fuller DO      benztropine  1 mg Intramuscular Q4H PRN Max 6/day KENNETH Hurley      benztropine  0.5 mg Oral Q4H PRN Max 6/day EllynKENNETH Freitas      bisacodyl  10 mg Rectal Daily PRN Willard Quiles Gifty, CRNP      cyanocobalamin  1,000 mcg Oral Daily Stefanie CeciliaMccauley, CRNP      cyanocobalamin  1,000 mcg Intramuscular Q30 Days Stefanie Janessa, JACQUELINENP      DULoxetine  90 mg Oral Daily Lopez Fuller, DO      gabapentin  600 mg Oral TID Lamont Benson, CRNP      glycerin-hypromellose-  1 drop Both Eyes Q3H PRN Ellyn Lizz Gifty, CRNP      hydrOXYzine HCL  25 mg Oral Q6H PRN Max 4/day Ellyn Lizz Gifty, CRNP      hydrOXYzine HCL  50 mg Oral Q6H PRN Max 4/day Ellyn Lizz Gifty, CRNP      ibuprofen  600 mg Oral Q8H [address] [facility], CRNP      insulin glargine  20 Units Subcutaneous HS Ellyntierney Durbin, CRNP      insulin lispro  1-5 Units Subcutaneous TID [facility]      insulin lispro  1-5 Units Subcutaneous HS Stefanie CeciliaGarrick, CRNP      lidocaine  1 patch Topical Daily Stefanie Riddle, CRNP      LORazepam  1 mg Intramuscular Q6H PRN Max 3/day Ellyntierney Zamudio [place], CRNP      LORazepam  1 mg Oral Q6H PRN Max 3/day Ellyntierney Zamudio [place], CRNP      melatonin  3 mg Oral HS Ellyntierney Zamudio [place], CRNP      meloxicam  7.5 mg Oral Daily Stefaniearturo BrooksGarrick, CRNP      metFORMIN  1,000 mg Oral BID With Meals Stefanie CeciliaGarrick, CRNP      methocarbamol  750 mg Oral Q8H [address] [facility], CR[facility]      nicotine  1 patch Transdermal Daily Stefanie GerowGarrick, CRNP      OLANZapine  5 mg Intramuscular Q3H PRN Max 3/day Ellyn Lizz [place], CRNP      OLANZapine  2.5 mg Oral Q4H PRN Max 6/day Ellyn Lizz Gifty, CRNP      OLANZapine  5 mg Oral Q4H PRN Max 3/day Ellyn Lizz [place], CRNP      OLANZapine  5 mg Oral Q3H PRN Max 3/day Ellyn Lizz Gifty, CRNP      polyethylene glycol  17 g Oral Daily PRN Ellyn Lizz Gifty, CRNP      propranolol  5 mg Oral Q8H PRN KENNETH Hurley      senna-docusate sodium  1 tablet Oral Daily PRN KENNETH Hurley      traZODone  50 mg Oral HS PRN Alyssa Rausch Medications:   all current active meds have been reviewed. Laboratory results:  I have personally reviewed all pertinent laboratory/tests results.    Recent Results (from the past 48 hour(s))   Fingerstick Glucose (POCT)    Collection Time: 12/05/23 12:21 PM   Result Value Ref Range    POC Glucose 293 (H) 65 - 140 mg/dl   Fingerstick Glucose (POCT)    Collection Time: 12/05/23  4:20 PM   Result Value Ref Range    POC Glucose 148 (H) 65 - 140 mg/dl   Fingerstick Glucose (POCT)    Collection Time: 12/05/23  8:45 PM   Result Value Ref Range    POC Glucose 148 (H) 65 - 140 mg/dl   Fingerstick Glucose (POCT)    Collection Time: 12/06/23  7:27 AM   Result Value Ref Range    POC Glucose 166 (H) 65 - 140 mg/dl   Fingerstick Glucose (POCT)    Collection Time: 12/06/23 12:04 PM   Result Value Ref Range    POC Glucose 136 65 - 140 mg/dl   Fingerstick Glucose (POCT)    Collection Time: 12/06/23  4:14 PM   Result Value Ref Range    POC Glucose 311 (H) 65 - 140 mg/dl   Fingerstick Glucose (POCT)    Collection Time: 12/06/23  9:16 PM   Result Value Ref Range    POC Glucose 164 (H) 65 - 140 mg/dl   Fingerstick Glucose (POCT)    Collection Time: 12/07/23  7:26 AM   Result Value Ref Range    POC Glucose 79 65 - 140 mg/dl            Chi Bower DO 12/07/23

## 2023-12-07 NOTE — NURSING NOTE
Patient visible on the unit for meds and meals but otherwise remains in her room reading a book. Patient became upset at dinner and yelled "It doesn't take 20 minutes to pass trays!" Limits set and patient encouraged to have patience. She endorses anxiety d/t having "unmet needs when I need something" referring to having to wait for her dinner tray. Medication compliant. Safety checks ongoing.

## 2023-12-08 LAB
GLUCOSE SERPL-MCNC: 113 MG/DL (ref 65–140)
GLUCOSE SERPL-MCNC: 182 MG/DL (ref 65–140)
GLUCOSE SERPL-MCNC: 187 MG/DL (ref 65–140)
GLUCOSE SERPL-MCNC: 92 MG/DL (ref 65–140)

## 2023-12-08 PROCEDURE — 82948 REAGENT STRIP/BLOOD GLUCOSE: CPT

## 2023-12-08 PROCEDURE — 99232 SBSQ HOSP IP/OBS MODERATE 35: CPT | Performed by: PSYCHIATRY & NEUROLOGY

## 2023-12-08 RX ORDER — TRAZODONE HYDROCHLORIDE 50 MG/1
50 TABLET ORAL
Status: DISCONTINUED | OUTPATIENT
Start: 2023-12-08 | End: 2023-12-13 | Stop reason: HOSPADM

## 2023-12-08 RX ORDER — LIDOCAINE 50 MG/G
1 PATCH TOPICAL
Status: DISCONTINUED | OUTPATIENT
Start: 2023-12-08 | End: 2023-12-13 | Stop reason: HOSPADM

## 2023-12-08 RX ADMIN — ACETAMINOPHEN 975 MG: 325 TABLET ORAL at 17:11

## 2023-12-08 RX ADMIN — METHOCARBAMOL 750 MG: 750 TABLET, FILM COATED ORAL at 06:20

## 2023-12-08 RX ADMIN — INSULIN LISPRO 1 UNITS: 100 INJECTION, SOLUTION INTRAVENOUS; SUBCUTANEOUS at 21:26

## 2023-12-08 RX ADMIN — ACETAMINOPHEN 975 MG: 325 TABLET ORAL at 11:47

## 2023-12-08 RX ADMIN — INSULIN GLARGINE 20 UNITS: 100 INJECTION, SOLUTION SUBCUTANEOUS at 21:04

## 2023-12-08 RX ADMIN — MELATONIN TAB 3 MG 3 MG: 3 TAB at 21:04

## 2023-12-08 RX ADMIN — GABAPENTIN 600 MG: 300 CAPSULE ORAL at 08:45

## 2023-12-08 RX ADMIN — GABAPENTIN 600 MG: 300 CAPSULE ORAL at 21:04

## 2023-12-08 RX ADMIN — METFORMIN HYDROCHLORIDE 1000 MG: 500 TABLET, FILM COATED ORAL at 08:45

## 2023-12-08 RX ADMIN — INSULIN LISPRO 1 UNITS: 100 INJECTION, SOLUTION INTRAVENOUS; SUBCUTANEOUS at 17:12

## 2023-12-08 RX ADMIN — ACETAMINOPHEN 975 MG: 325 TABLET ORAL at 00:43

## 2023-12-08 RX ADMIN — IBUPROFEN 600 MG: 600 TABLET ORAL at 06:20

## 2023-12-08 RX ADMIN — NICOTINE 1 PATCH: 21 PATCH, EXTENDED RELEASE TRANSDERMAL at 08:50

## 2023-12-08 RX ADMIN — METHOCARBAMOL 750 MG: 750 TABLET, FILM COATED ORAL at 21:03

## 2023-12-08 RX ADMIN — IBUPROFEN 600 MG: 600 TABLET ORAL at 13:44

## 2023-12-08 RX ADMIN — ARIPIPRAZOLE 5 MG: 5 TABLET ORAL at 08:45

## 2023-12-08 RX ADMIN — MELOXICAM 7.5 MG: 15 TABLET ORAL at 08:45

## 2023-12-08 RX ADMIN — IBUPROFEN 600 MG: 600 TABLET ORAL at 21:03

## 2023-12-08 RX ADMIN — GABAPENTIN 600 MG: 300 CAPSULE ORAL at 17:11

## 2023-12-08 RX ADMIN — DULOXETINE HYDROCHLORIDE 90 MG: 30 CAPSULE, DELAYED RELEASE ORAL at 08:45

## 2023-12-08 RX ADMIN — METHOCARBAMOL 750 MG: 750 TABLET, FILM COATED ORAL at 13:44

## 2023-12-08 RX ADMIN — METFORMIN HYDROCHLORIDE 1000 MG: 500 TABLET, FILM COATED ORAL at 17:11

## 2023-12-08 RX ADMIN — LIDOCAINE 5% 1 PATCH: 700 PATCH TOPICAL at 21:05

## 2023-12-08 RX ADMIN — TRAZODONE HYDROCHLORIDE 50 MG: 50 TABLET ORAL at 21:04

## 2023-12-08 RX ADMIN — CYANOCOBALAMIN TAB 1000 MCG 1000 MCG: 1000 TAB at 08:45

## 2023-12-08 NOTE — TREATMENT TEAM
12/08/23 1700   Pain Assessment   Pain Assessment Tool 0-10   Pain Score 5   Pain Location/Orientation Location: Leg;Location: Back;Orientation: Lower;Orientation: Right   Pain Radiating Towards legs   Pain Onset/Description Onset: Ongoing   Effect of Pain on Daily Activities limiting   Patient's Stated Pain Goal No pain   Hospital Pain Intervention(s) Medication (See MAR)   Multiple Pain Sites No     Scheduled Tylenol 975 given

## 2023-12-08 NOTE — PROGRESS NOTES
Progress Note - Behavioral Health   Sayda Azar 52 y.o. female MRN: [id number]  Unit/Bed#: Simon Santos 177-13 Encounter: [id number]    Assessment/Plan   Principal Problem:    Severe episode of recurrent major depressive disorder, without psychotic features ([address])  Active Problems:    Medical clearance for psychiatric admission    Intractable low back pain    Type 2 diabetes mellitus with hemoglobin A1c goal of less than 7.0% ([facility])    Vitamin B12 deficiency    Methamphetamine abuse in remission ([address])    Tobacco abuse    Opioid use disorder      Subjective: Patient was seen, chart was reviewed, and case was discussed with entire team.       Patient seen in small TV room. Patient is very pleasant, remains calm and cooperative. Patient is still rather talkative. She does exhibit some anxieties. She reports her sleep would be better if her trazodone was scheduled which we can do. Patient reports being much relieved that she was able to get a hold of her son finally. She is knows that he is safe and she gave him the number so he can actually call her just to talk. Patient is still wanting to be a solid support for her son who has been going through a lot lately. She also reports that her clinical support is going to bring her her belongings as well as help her get situated for her new dwelling when she is discharged. Patient has been eating well. Patient has been medication compliant        Psychiatric Review of Systems:  Behavior over the last 24 hours:  Improving  Sleep: improving but would like to have trazodone schedule. Appetite: adequate  Medication side effects: none verbalized  Medical ROS: Complete review of systems is negative except as noted above.             Vitals:  Vitals:    12/08/23 0727   BP: 119/77   Pulse: 94   Resp: 17   Temp: 97.6 °F (36.4 °C)   SpO2: 96%       Mental Status Exam:    Appearance:  alert, good eye contact, appears stated age, and casually dressed   Behavior:  calm, cooperative, and pleasant   Speech:  normal rate, normal volume, and talkative     Mood:  Patient reports that she does not seem to have depression and only some anxieties currently. Affect:  brighter   Thought Process:  goal directed, circumstantial,     Thought Content:  Less negative today   Perceptual Disturbances: no reported hallucinations and does not appear to be responding to internal stimuli at this time   Risk Potential: Suicidal ideation - None at present  Homicidal ideation - None at present  Potential for aggression - Not at present   Sensorium:  oriented to person, place, and time/date   Memory:  Improving   Consciousness:  alert and awake   Attention/Concentration: attention span and concentration appear shorter than expected for age   Insight:  improving   Judgment: improving   Gait/Station: normal gait/station, normal balance   Motor Activity: no abnormal movements     Progress Toward Goals: Progressing    Recommended Treatment: Continue with pharmacotherapy, group therapy, milieu therapy and occupational therapy. Continue frequent safety checks and vitals per unit protocol. Continue with medical management as indicated. Continue coordinating with case management regarding disposition  1. We will continue current medications and treatments for now.   2.  Discharge planning      Risks, benefits and possible side effects of Medications: Risks, benefits, and possible side effects of medications have been explained to the patient, who verbalizes understanding      Current Medications:  Current Facility-Administered Medications   Medication Dose Route Frequency Provider Last Rate    acetaminophen  975 mg Oral Q6H [facility] KENNETH Lawrence      albuterol  2 puff Inhalation Q4H PRN KENNETH Lawrence      aluminum-magnesium hydroxide-simethicone  30 mL Oral Q4H PRN KENNETH Hurley      ARIPiprazole  5 mg Oral Daily Lopez Fuller, DO      benztropine  1 mg Intramuscular Q4H PRN Max 6/day Monica Ratroy Durbin, CRNP      benztropine  0.5 mg Oral Q4H PRN Max 6/day Ellyn Lizz Fairbanks, CRNP      bisacodyl  10 mg Rectal Daily PRN Ellyntierney Zamudio Gifty, CRNP      cyanocobalamin  1,000 mcg Oral Daily Stefanie Riddle, CRALFREDO      cyanocobalamin  1,000 mcg Intramuscular Q30 Days Stefanie Riddle, KENNETH      DULoxetine  90 mg Oral Daily Lopez Fulelr, DO      gabapentin  600 mg Oral TID Forpablo Caballero, CRNP      glycerin-hypromellose-  1 drop Both Eyes Q3H PRN Ellyn Lizz Fairbanks, CRNP      hydrOXYzine HCL  25 mg Oral Q6H PRN Max 4/day Ellyn Lizz Fairbanks, CRNP      hydrOXYzine HCL  50 mg Oral Q6H PRN Max 4/day Ellyn Irene Fairbanks, CRNP      ibuprofen  600 mg Oral Q8H [facility] KENNETH Lawrence      insulin glargine  20 Units Subcutaneous HS Ellyntierney Zamudio Fairbanks, CRNP      insulin lispro  1-5 Units Subcutaneous TID AC Stefanie Riddle, CRNP      insulin lispro  1-5 Units Subcutaneous HS Stefanie Riddle, KENNETH      lidocaine  1 patch Topical Daily Stefanie Riddle, KENNETH      LORazepam  1 mg Intramuscular Q6H PRN Max 3/day Ellyntierney Zamudio Fairbanks, KENNETH      LORazepam  1 mg Oral Q6H PRN Max 3/day Ellyntierney Zamudio Gifty, KENNETH      melatonin  3 mg Oral HS Ellyn Lizz Fairbanks, CRNP      meloxicam  7.5 mg Oral Daily Stefanie Riddle, JACQUELINENP      metFORMIN  1,000 mg Oral BID With Meals KENNETH Lawrence      methocarbamol  750 mg Oral Q8H [facility] Stefanie Riddle, KENNETH      nicotine  1 patch Transdermal Daily KENNETH Lawrence      OLANZapine  5 mg Intramuscular Q3H PRN Max 3/day Ellyntierney Zamudio Gifty, KENNETH      OLANZapine  2.5 mg Oral Q4H PRN Max 6/day [facility] LizzKENNETH Mckeon      OLANZapine  5 mg Oral Q4H PRN Max 3/day KENNETH Hurley      OLANZapine  5 mg Oral Q3H PRN Max 3/day KENNETH Hurley      polyethylene glycol  17 g Oral Daily PRN KENNETH Hurley      propranolol  5 mg Oral Q8H PRN KENNETH Weems senna-docusate sodium  1 tablet Oral Daily PRN KENNETH Hurley      traZODone  50 mg Oral HS PRN KENNETH Anglin         Behavioral Health Medications:   all current active meds have been reviewed. Laboratory results:  I have personally reviewed all pertinent laboratory/tests results.    Recent Results (from the past 48 hour(s))   Fingerstick Glucose (POCT)    Collection Time: 12/06/23 12:04 PM   Result Value Ref Range    POC Glucose 136 65 - 140 mg/dl   Fingerstick Glucose (POCT)    Collection Time: 12/06/23  4:14 PM   Result Value Ref Range    POC Glucose 311 (H) 65 - 140 mg/dl   Fingerstick Glucose (POCT)    Collection Time: 12/06/23  9:16 PM   Result Value Ref Range    POC Glucose 164 (H) 65 - 140 mg/dl   Fingerstick Glucose (POCT)    Collection Time: 12/07/23  7:26 AM   Result Value Ref Range    POC Glucose 79 65 - 140 mg/dl   Fingerstick Glucose (POCT)    Collection Time: 12/07/23 12:05 PM   Result Value Ref Range    POC Glucose 157 (H) 65 - 140 mg/dl   Fingerstick Glucose (POCT)    Collection Time: 12/07/23  4:29 PM   Result Value Ref Range    POC Glucose 224 (H) 65 - 140 mg/dl   Fingerstick Glucose (POCT)    Collection Time: 12/07/23  9:04 PM   Result Value Ref Range    POC Glucose 133 65 - 140 mg/dl   Fingerstick Glucose (POCT)    Collection Time: 12/08/23  7:16 AM   Result Value Ref Range    POC Glucose 92 65 - 140 mg/dl            Hector Campa DO 12/08/23

## 2023-12-08 NOTE — PROGRESS NOTES
12/08/23 0900   Team Meeting   Meeting Type Daily Rounds   Initial Conference Date 12/08/23   Team Members Present   Team Members Present Physician;Nurse;;; Occupational Therapist   Physician Team Member Yris   Nursing Team Member [facility] Management Team Member [facility] [address] Work Team Member  --    OT Team Member Gulshan Hughes   Patient/Family Present   Patient Present No   Patient's Family Present No     Reported some issues with sleep yesterday and asked if Trazadone could be routine in order to help. Reports doing better yesterday with depression and anxiety. Discharge is pending.

## 2023-12-08 NOTE — SOCIAL WORK
CM spoke to patient about progress and discharge planning. Made patient aware that I spoke to [facility] and she verified that patient did obtain a motel room but was not aware of the location. Patient verified that she was able to obtain a room in [place] called the [facility]. CM will [facility] to [facility]. Patient was also scheduled with [facility]. Patient was calm and cooperative. Patient asked if CM could reach out to son as he has not been able to get ahold of him and neither has a family friend. CM stated that I would reach out. Occupational Therapy        Patient Name: Cayetano Hawkins  Age:  44 y.o., Sex:  male  Medical Record #: [id number]  Today's Date: 8/5/2020     Precautions  Comments: post-op shoe      DC Equipment Recommendations: None  Discharge Recommendations: Anticipate that the patient will have no further occupational therapy needs after discharge from the hospital        08/05/20 1125   Interdisciplinary Plan of Care Collaboration   Collaboration Comments OT order received. Met with patient who reports that he has been up to the bathroom, changed his clothes and stood at the sink to complete his grooming today without assist. Pt reports that he has been dealing with his right foot hurting following surgery for awhile and has no concerns about being able to complete his basic ADLs and ADL transfers at home. No skilled OT evaluation indicated in the acute care setting at this time.

## 2023-12-08 NOTE — PLAN OF CARE
Problem: Risk for Self Injury/Neglect  Goal: Treatment Goal: Remain safe during length of stay, learn and adopt new coping skills, and be free of self-injurious ideation, impulses and acts at the time of discharge  Outcome: Progressing  Goal: Refrain from harming self  Description: Interventions:  - Monitor patient closely, per order  - Develop a trusting relationship  - Supervise medication ingestion, monitor effects and side effects   Outcome: Progressing  Goal: Recognize maladaptive responses and adopt new coping mechanisms  Outcome: Progressing     Problem: Depression  Goal: Treatment Goal: Demonstrate behavioral control of depressive symptoms, verbalize feelings of improved mood/affect, and adopt new coping skills prior to discharge  Outcome: Progressing     Problem: Anxiety  Goal: Anxiety is at manageable level  Description: Interventions:  - Assess and monitor patient's anxiety level. - Monitor for signs and symptoms (heart palpitations, chest pain, shortness of breath, headaches, nausea, feeling jumpy, restlessness, irritable, apprehensive). - Collaborate with interdisciplinary team and initiate plan and interventions as ordered.   - [place] patient to unit/surroundings  - Explain treatment plan  - Encourage participation in care  - Encourage verbalization of concerns/fears  - Identify coping mechanisms  - Assist in developing anxiety-reducing skills  - Administer/offer alternative therapies  - Limit or eliminate stimulants  Outcome: Progressing

## 2023-12-08 NOTE — NURSING NOTE
Patient is alert and pleasant on approach. Patient has been social and pleasant with peers. Patient stated she's felt good today and that pain has been manageable. Patient denies anxiety, depression and SI. Patient requested Lidocaine patch be scheduled for night time instead of during the day and order was modified. Patient is cooperative and medication compliant.

## 2023-12-08 NOTE — TREATMENT TEAM
Pt attended community meeting. Pt able to self express. Pt making slow and steady progress on her mh recovery goals. Pt noted her mh roecvery goals. Pt felt humbled and hopes once she is d/c she does not return to the hospital.  Pt expressed being patient with her needs and having understanding. 12/08/23 1000   Activity/Group Checklist   Group Community meeting   Attendance Attended   Attendance Duration (min) 31-45   Interactions Interacted appropriately   Affect/Mood Appropriate   Goals Achieved Identified feelings; Identified relapse prevention strategies; Discussed self-esteem issues; Able to listen to others; Able to reflect/comment on own behavior;Able to engage in interactions;Verbalized increased hopefulness; Able to manage/cope with feelings

## 2023-12-08 NOTE — NURSING NOTE
Patient requested trazodone and stated "it's the only way I can sleep." PRN trazodone 50 mg administered at 2125.

## 2023-12-08 NOTE — SOCIAL WORK
Cm reached out to son Aristeo Monsivais as patient had concerns over not being able to reach him. He answered and shared that he had not been aware that she was trying to reach out or that she was in the hospital currently. He asked some questions about progress and status and was informed that mother will reach out to him soon.

## 2023-12-09 LAB
GLUCOSE SERPL-MCNC: 159 MG/DL (ref 65–140)
GLUCOSE SERPL-MCNC: 176 MG/DL (ref 65–140)
GLUCOSE SERPL-MCNC: 230 MG/DL (ref 65–140)
GLUCOSE SERPL-MCNC: 87 MG/DL (ref 65–140)
GLUCOSE SERPL-MCNC: 97 MG/DL (ref 65–140)

## 2023-12-09 PROCEDURE — 82948 REAGENT STRIP/BLOOD GLUCOSE: CPT

## 2023-12-09 PROCEDURE — 99232 SBSQ HOSP IP/OBS MODERATE 35: CPT | Performed by: PSYCHIATRY & NEUROLOGY

## 2023-12-09 RX ADMIN — ACETAMINOPHEN 975 MG: 325 TABLET ORAL at 12:48

## 2023-12-09 RX ADMIN — MELOXICAM 7.5 MG: 15 TABLET ORAL at 08:29

## 2023-12-09 RX ADMIN — CYANOCOBALAMIN TAB 1000 MCG 1000 MCG: 1000 TAB at 08:32

## 2023-12-09 RX ADMIN — TRAZODONE HYDROCHLORIDE 50 MG: 50 TABLET ORAL at 21:43

## 2023-12-09 RX ADMIN — HYDROXYZINE HYDROCHLORIDE 25 MG: 25 TABLET, FILM COATED ORAL at 05:35

## 2023-12-09 RX ADMIN — METFORMIN HYDROCHLORIDE 1000 MG: 500 TABLET, FILM COATED ORAL at 17:11

## 2023-12-09 RX ADMIN — GABAPENTIN 600 MG: 300 CAPSULE ORAL at 08:31

## 2023-12-09 RX ADMIN — NICOTINE 1 PATCH: 21 PATCH, EXTENDED RELEASE TRANSDERMAL at 08:32

## 2023-12-09 RX ADMIN — INSULIN LISPRO 1 UNITS: 100 INJECTION, SOLUTION INTRAVENOUS; SUBCUTANEOUS at 17:12

## 2023-12-09 RX ADMIN — METHOCARBAMOL 750 MG: 750 TABLET, FILM COATED ORAL at 21:43

## 2023-12-09 RX ADMIN — GABAPENTIN 600 MG: 300 CAPSULE ORAL at 21:42

## 2023-12-09 RX ADMIN — IBUPROFEN 600 MG: 600 TABLET ORAL at 14:31

## 2023-12-09 RX ADMIN — ALBUTEROL SULFATE 2 PUFF: 90 AEROSOL, METERED RESPIRATORY (INHALATION) at 05:57

## 2023-12-09 RX ADMIN — LIDOCAINE 5% 1 PATCH: 700 PATCH TOPICAL at 21:45

## 2023-12-09 RX ADMIN — ACETAMINOPHEN 975 MG: 325 TABLET ORAL at 05:34

## 2023-12-09 RX ADMIN — METFORMIN HYDROCHLORIDE 1000 MG: 500 TABLET, FILM COATED ORAL at 08:31

## 2023-12-09 RX ADMIN — METHOCARBAMOL 750 MG: 750 TABLET, FILM COATED ORAL at 14:31

## 2023-12-09 RX ADMIN — MELATONIN TAB 3 MG 3 MG: 3 TAB at 21:42

## 2023-12-09 RX ADMIN — INSULIN LISPRO 2 UNITS: 100 INJECTION, SOLUTION INTRAVENOUS; SUBCUTANEOUS at 12:49

## 2023-12-09 RX ADMIN — DULOXETINE HYDROCHLORIDE 90 MG: 30 CAPSULE, DELAYED RELEASE ORAL at 08:31

## 2023-12-09 RX ADMIN — ALBUTEROL SULFATE 2 PUFF: 90 AEROSOL, METERED RESPIRATORY (INHALATION) at 21:43

## 2023-12-09 RX ADMIN — INSULIN GLARGINE 20 UNITS: 100 INJECTION, SOLUTION SUBCUTANEOUS at 21:43

## 2023-12-09 RX ADMIN — HYDROXYZINE HYDROCHLORIDE 25 MG: 25 TABLET, FILM COATED ORAL at 21:59

## 2023-12-09 RX ADMIN — INSULIN LISPRO 1 UNITS: 100 INJECTION, SOLUTION INTRAVENOUS; SUBCUTANEOUS at 21:43

## 2023-12-09 RX ADMIN — METHOCARBAMOL 750 MG: 750 TABLET, FILM COATED ORAL at 05:34

## 2023-12-09 RX ADMIN — ACETAMINOPHEN 975 MG: 325 TABLET ORAL at 17:11

## 2023-12-09 RX ADMIN — HYDROXYZINE HYDROCHLORIDE 25 MG: 25 TABLET, FILM COATED ORAL at 12:49

## 2023-12-09 RX ADMIN — IBUPROFEN 600 MG: 600 TABLET ORAL at 21:42

## 2023-12-09 RX ADMIN — IBUPROFEN 600 MG: 600 TABLET ORAL at 05:34

## 2023-12-09 RX ADMIN — ARIPIPRAZOLE 5 MG: 5 TABLET ORAL at 08:31

## 2023-12-09 RX ADMIN — GABAPENTIN 600 MG: 300 CAPSULE ORAL at 17:11

## 2023-12-09 NOTE — NURSING NOTE
Patient visible and social. Patient med compliant. Patient c/o chronic lower back pain which she received scheduled Tylenol and Motrin for. Appetite intact. BP was high 188/83 at 9 pm vitals. Writer rechecked BP manually and it was 150/82. Patient denies any distress.  Continual rounding in place

## 2023-12-09 NOTE — PROGRESS NOTES
This note was not shared with the patient due to reasonable likelihood of causing patient harm   Progress Note - [facility] 52 y.o. female MRN: [id number]  Unit/Bed#: Adilson Felix 468-76 Encounter: [id number]    Assessment/Plan   Principal Problem:    Severe episode of recurrent major depressive disorder, without psychotic features ([address])  Active Problems:    Medical clearance for psychiatric admission    Intractable low back pain    Type 2 diabetes mellitus with hemoglobin A1c goal of less than 7.0% ([facility])    Vitamin B12 deficiency    Methamphetamine abuse in remission ([address])    Tobacco abuse    Opioid use disorder      Subjective: Patient was seen, chart was reviewed, and case was discussed with entire team.     Nicole Downing was seen laying down in her room, reported mood as " very well" , spent time complete art work . Also + rumination about somatic complaints " mucus in the chest . Although without sleep complaints , duration about 6 hr. .Jovanny Brar stated mood as " depressed although improving , attended groups on milieu. There's hopelessness - as she stated " seeing no end in sight." Nicole Downing recently spoke to her son, described as decent. Psychiatric Review of Systems:  Behavior over the last 24 hours:  Improving  Sleep: stable, early riser , at 4 in the morning- her normal   Appetite: adequate  Medication side effects: w/o complaints   Medical ROS: Complete review of systems is negative except as noted above. Vitals:  Vitals:    12/08/23 2055 12/08/23 2100 12/09/23 0727 12/09/23 1528   BP: (!) 188/83 150/82 163/86 146/82   BP Location: Left arm Left arm Right arm Left arm   Pulse: 68  87 92   Resp: 16  16 16   Temp: 97.7 °F (36.5 °C)  97.6 °F (36.4 °C) (!) 97.3 °F (36.3 °C)   TempSrc: Temporal  Temporal Temporal   SpO2: 98%  90% 98%   Weight:       Height:             Mental Status Exam:    Appearance:  Jovanny Brar , is a 52 y.o.   female , alert, appears stated age, and casually dressed   Behavior:  calm, cooperative, and pleasant, avoidant eye contact    Speech:  normal rate, normal volume, and talkative     Mood:  Depressed    Affect:  Slightly anxious   Thought Process:  logical, circumstantial   Thought Content:  no verbalized delusions or overt paranoia, more positive tone   Perceptual Disturbances: Does not appear responding or preoccupied    Risk Potential: Suicidal ideation  w/o  Homicidal ideation - w/o  Potential for aggression - w/o   Sensorium:  oriented to person, place( [place]), and time/date ( Dec 2023), situation   Memory:  recent and remote memory grossly intact   Consciousness:  alert and awake   Attention/Concentration: attention span and concentration appear shorter than expected for age   Insight:  improving   Judgment: improving   Gait/Station: Normal    Motor Activity: no abnormal movements     Progress Toward Goals: Progressing    Recommended Treatment: Continue with pharmacotherapy, group therapy, milieu therapy and occupational therapy. Continue frequent safety checks and vitals per unit protocol. Continue with medical management as indicated. Continue coordinating with case management regarding disposition  1. We will continue current medications and treatments for now.   2.  Discharge planning      Risks, benefits and possible side effects of Medications: Risks, benefits, and possible side effects of medications have been explained to the patient, who verbalizes understanding      Current Medications:  Current Facility-Administered Medications   Medication Dose Route Frequency Provider Last Rate    acetaminophen  975 mg Oral Q6H [facility] KENNETH Lawrence      albuterol  2 puff Inhalation Q4H PRN KENNETH Lawrence      aluminum-magnesium hydroxide-simethicone  30 mL Oral Q4H PRN KENNETH Hurley      ARIPiprazole  5 mg Oral Daily Lopez Fuller DO      benztropine  1 mg Intramuscular Q4H PRN Max 6/day KENNETH Coronado benztropine  0.5 mg Oral Q4H PRN Max 6/day Ellyn Lizz Gifty, CRNP      bisacodyl  10 mg Rectal Daily PRN Ellyn Lizz Blair, CRNP      cyanocobalamin  1,000 mcg Oral Daily Stefanie Riddle, CRNP      cyanocobalamin  1,000 mcg Intramuscular Q30 Days Stefaniechico Riddle, CRNP      DULoxetine  90 mg Oral Daily Lopez Fuller, DO      gabapentin  600 mg Oral TID Ángela Vazquez, CRNP      glycerin-hypromellose-  1 drop Both Eyes Q3H PRN Ellyn Lizz Gifty, CRNP      hydrOXYzine HCL  25 mg Oral Q6H PRN Max 4/day Ellyn Lizz Blair, CRNP      hydrOXYzine HCL  50 mg Oral Q6H PRN Max 4/day Ellyn Lizz Blair, CRNP      ibuprofen  600 mg Oral Q8H [address] Stefanie CeciliaGarrick, CRNP      insulin glargine  20 Units Subcutaneous HS Ellyn Lizz Gifty, CRNP      insulin lispro  1-5 Units Subcutaneous TID AC Stefaniearturo Riddle, CRNP      insulin lispro  1-5 Units Subcutaneous HS Stefaniearturo Riddle, CRNP      lidocaine  1 patch Topical HS Stefanie Riddle, JACQUELINENP      LORazepam  1 mg Intramuscular Q6H PRN Max 3/day Ellyntierney Zamudio Blair, CRNP      LORazepam  1 mg Oral Q6H PRN Max 3/day Ellyntierney Zamudio Gifty, CRNP      melatonin  3 mg Oral HS Ellyntierney Zamudio Gifty, CRNP      meloxicam  7.5 mg Oral Daily Stefanie Riddle, CRNP      metFORMIN  1,000 mg Oral BID With Meals Stefanie Riddle, CRNP      methocarbamol  750 mg Oral Q8H [address] Stefanie CeciliaGarrick, CRNP      nicotine  1 patch Transdermal Daily Stefanie Riddle, CRNP      OLANZapine  5 mg Intramuscular Q3H PRN Max 3/day Ellyn Lizz Gifty, CRNP      OLANZapine  2.5 mg Oral Q4H PRN Max 6/day Ellyn Lizz Gifty, CRNP      OLANZapine  5 mg Oral Q4H PRN Max 3/day Ellyn Lizz Gifty, CRNP      OLANZapine  5 mg Oral Q3H PRN Max 3/day KENNETH Hurley      polyethylene glycol  17 g Oral Daily PRN KENNETH Hurley      propranolol  5 mg Oral Q8H PRN KENNETH Hurley      senna-docusate sodium  1 tablet Oral Daily PRN KENNETH Muñoz      traZODone  50 mg Oral HS Shae Wylie DO         Behavioral Health Medications:   all current active meds have been reviewed. Laboratory results:  I have personally reviewed all pertinent laboratory/tests results. Recent Results (from the past 48 hour(s))   Fingerstick Glucose (POCT)    Collection Time: 12/07/23 12:05 PM   Result Value Ref Range    POC Glucose 157 (H) 65 - 140 mg/dl   Fingerstick Glucose (POCT)    Collection Time: 12/07/23  4:29 PM   Result Value Ref Range    POC Glucose 224 (H) 65 - 140 mg/dl   Fingerstick Glucose (POCT)    Collection Time: 12/07/23  9:04 PM   Result Value Ref Range    POC Glucose 133 65 - 140 mg/dl   Fingerstick Glucose (POCT)    Collection Time: 12/08/23  7:16 AM   Result Value Ref Range    POC Glucose 92 65 - 140 mg/dl   Fingerstick Glucose (POCT)    Collection Time: 12/08/23 11:54 AM   Result Value Ref Range    POC Glucose 113 65 - 140 mg/dl   Fingerstick Glucose (POCT)    Collection Time: 12/08/23  4:25 PM   Result Value Ref Range    POC Glucose 187 (H) 65 - 140 mg/dl   Fingerstick Glucose (POCT)    Collection Time: 12/08/23  9:09 PM   Result Value Ref Range    POC Glucose 182 (H) 65 - 140 mg/dl   Fingerstick Glucose (POCT)    Collection Time: 12/09/23  5:17 AM   Result Value Ref Range    POC Glucose 87 65 - 140 mg/dl   Fingerstick Glucose (POCT)    Collection Time: 12/09/23  7:25 AM   Result Value Ref Range    POC Glucose 97 65 - 140 mg/dl            Counseling / Coordination of Care  Patient Encounter Time:  3:55PM- 4:05Pm; Documentation Time: 8:20 PM-8:33 PM  Total floor / unit time spent today 25 minutes. Greater than 50% of total time was spent with the patient and / or family counseling and / or coordination of care. A description of the counseling / coordination of care: Risk assessment, stabilization planning, treatment management       This note may have been written with the assistance of dictation software.  Please excuse any grammatical  errors, misspellings,  and abnormal spacing of letters , sentences or paragraphs .  For accurate interpretation should read note Horizontally

## 2023-12-09 NOTE — PLAN OF CARE
Problem: Anxiety  Goal: Anxiety is at manageable level  Description: Interventions:  - Assess and monitor patient's anxiety level. - Monitor for signs and symptoms (heart palpitations, chest pain, shortness of breath, headaches, nausea, feeling jumpy, restlessness, irritable, apprehensive). - Collaborate with interdisciplinary team and initiate plan and interventions as ordered.   - [place] patient to unit/surroundings  - Explain treatment plan  - Encourage participation in care  - Encourage verbalization of concerns/fears  - Identify coping mechanisms  - Assist in developing anxiety-reducing skills  - Administer/offer alternative therapies  - Limit or eliminate stimulants  Outcome: Progressing     Problem: Ineffective Coping  Goal: Participates in unit activities  Description: Interventions:  - Provide therapeutic environment   - Provide required programming   - Redirect inappropriate behaviors   Outcome: Progressing     Problem: Depression  Goal: Treatment Goal: Demonstrate behavioral control of depressive symptoms, verbalize feelings of improved mood/affect, and adopt new coping skills prior to discharge  Outcome: Progressing

## 2023-12-09 NOTE — TREATMENT TEAM
12/09/23 0520   Dimas Anxiety Scale   Anxious Mood 2   Tension 2   Fears 1   Insomnia 0   Intellectual 2   Depressed Mood 1   Somatic Complaints: Muscular 1   Somatic Complaints: Sensory 1   Cardiovascular Symptoms 0   Respiratory Symptoms 1   Gastrointestinal Symptoms 1   Genitourinary Symptoms 1   Autonomic Symptoms 0   Behavior at Interview 1   Dimas Anxiety Score 14     PRN Atarax administered at 0537 for dimas scale 14

## 2023-12-09 NOTE — NURSING NOTE
Pt calm and cooperative, visible in dayroom for majority of shift. Coloring and social with peers. Pt did endorse anxiety due to chronic pain and stressors. Pt accepted atarax prn, pt reported effective for symptoms. Pt denies having any unmet needs currently. Denies SI currently.

## 2023-12-10 LAB
GLUCOSE SERPL-MCNC: 102 MG/DL (ref 65–140)
GLUCOSE SERPL-MCNC: 194 MG/DL (ref 65–140)
GLUCOSE SERPL-MCNC: 202 MG/DL (ref 65–140)
GLUCOSE SERPL-MCNC: 66 MG/DL (ref 65–140)

## 2023-12-10 PROCEDURE — 82948 REAGENT STRIP/BLOOD GLUCOSE: CPT

## 2023-12-10 RX ADMIN — MELOXICAM 7.5 MG: 15 TABLET ORAL at 08:39

## 2023-12-10 RX ADMIN — ARIPIPRAZOLE 5 MG: 5 TABLET ORAL at 08:37

## 2023-12-10 RX ADMIN — IBUPROFEN 600 MG: 600 TABLET ORAL at 21:36

## 2023-12-10 RX ADMIN — ALBUTEROL SULFATE 2 PUFF: 90 AEROSOL, METERED RESPIRATORY (INHALATION) at 21:37

## 2023-12-10 RX ADMIN — METHOCARBAMOL 750 MG: 750 TABLET, FILM COATED ORAL at 21:36

## 2023-12-10 RX ADMIN — INSULIN GLARGINE 20 UNITS: 100 INJECTION, SOLUTION SUBCUTANEOUS at 21:36

## 2023-12-10 RX ADMIN — HYDROXYZINE HYDROCHLORIDE 25 MG: 25 TABLET, FILM COATED ORAL at 14:25

## 2023-12-10 RX ADMIN — METHOCARBAMOL 750 MG: 750 TABLET, FILM COATED ORAL at 14:22

## 2023-12-10 RX ADMIN — GABAPENTIN 600 MG: 300 CAPSULE ORAL at 08:39

## 2023-12-10 RX ADMIN — LIDOCAINE 5% 1 PATCH: 700 PATCH TOPICAL at 21:54

## 2023-12-10 RX ADMIN — IBUPROFEN 600 MG: 600 TABLET ORAL at 05:03

## 2023-12-10 RX ADMIN — ACETAMINOPHEN 975 MG: 325 TABLET ORAL at 06:13

## 2023-12-10 RX ADMIN — METFORMIN HYDROCHLORIDE 1000 MG: 500 TABLET, FILM COATED ORAL at 17:02

## 2023-12-10 RX ADMIN — GABAPENTIN 600 MG: 300 CAPSULE ORAL at 17:02

## 2023-12-10 RX ADMIN — ALBUTEROL SULFATE 2 PUFF: 90 AEROSOL, METERED RESPIRATORY (INHALATION) at 17:03

## 2023-12-10 RX ADMIN — ACETAMINOPHEN 975 MG: 325 TABLET ORAL at 12:00

## 2023-12-10 RX ADMIN — ACETAMINOPHEN 975 MG: 325 TABLET ORAL at 17:02

## 2023-12-10 RX ADMIN — CYANOCOBALAMIN TAB 1000 MCG 1000 MCG: 1000 TAB at 08:38

## 2023-12-10 RX ADMIN — INSULIN LISPRO 1 UNITS: 100 INJECTION, SOLUTION INTRAVENOUS; SUBCUTANEOUS at 17:02

## 2023-12-10 RX ADMIN — METFORMIN HYDROCHLORIDE 1000 MG: 500 TABLET, FILM COATED ORAL at 08:42

## 2023-12-10 RX ADMIN — MELATONIN TAB 3 MG 3 MG: 3 TAB at 21:36

## 2023-12-10 RX ADMIN — METHOCARBAMOL 750 MG: 750 TABLET, FILM COATED ORAL at 05:03

## 2023-12-10 RX ADMIN — GABAPENTIN 600 MG: 300 CAPSULE ORAL at 21:36

## 2023-12-10 RX ADMIN — IBUPROFEN 600 MG: 600 TABLET ORAL at 14:22

## 2023-12-10 RX ADMIN — TRAZODONE HYDROCHLORIDE 50 MG: 50 TABLET ORAL at 21:36

## 2023-12-10 RX ADMIN — DULOXETINE HYDROCHLORIDE 90 MG: 30 CAPSULE, DELAYED RELEASE ORAL at 08:38

## 2023-12-10 RX ADMIN — NICOTINE 1 PATCH: 21 PATCH, EXTENDED RELEASE TRANSDERMAL at 08:39

## 2023-12-10 RX ADMIN — HYDROXYZINE HYDROCHLORIDE 25 MG: 25 TABLET, FILM COATED ORAL at 21:37

## 2023-12-10 NOTE — NURSING NOTE
Patient visible and social. Patient is med compliant. Patient bright on approach. Appetite intact. Patient has c/o mild cough. Cough drops given. PRN Albuterol given on request. VSS.  Continual safety checks ongoing

## 2023-12-10 NOTE — NURSING NOTE
Patient visible on the unit and social with peers. Bright and jovial when interacting with staff. Endorses anxiety d/t chronic back pain but reports pain is managed at this time. Medication compliant. Encouraged to inform staff of any needs or concerns.

## 2023-12-10 NOTE — NURSING NOTE
Patient c/o SOB and requested inhaler. PRN Albuterol inhaler given at 2143. Patient c/o anxiety. PRN Atarax 25 mg administered at 2159. Perales score 9.

## 2023-12-10 NOTE — PROGRESS NOTES
This note was not shared with the patient due to reasonable likelihood of causing patient harm   Progress Note - [address] 52 y.o. female MRN: [id number]  Unit/Bed#: Maya Medley 658-34 Encounter: [id number]    Assessment/Plan   Principal Problem:    Severe episode of recurrent major depressive disorder, without psychotic features ([address])  Active Problems:    Medical clearance for psychiatric admission    Intractable low back pain    Type 2 diabetes mellitus with hemoglobin A1c goal of less than 7.0% ([address])    Vitamin B12 deficiency    Methamphetamine abuse in remission ([address])    Tobacco abuse    Opioid use disorder      Subjective:   Patient , Lennox Vargas chart was reviewed,  Jaylin Buckley was seen in her room, in early afternoon, was polite and pleasant on approach. Reported mood as " not depressed, without racing thoughts, and no longer isolating , as she's learning to talk about her problems. Lennox Vargas reported goals of IOP and completing intake for D&A portion and plans to complete intake for regular IOP, reasoning goals were treatment of dual diagnose. Lennox Vargas has history of opioid dependency and now denies having cravings. Later complained of anxiety - persistent and recent trigger as another patient on the unit " described as " loud" and show  boaty. Denies any aggression or threats stated. Denies suicidal ideations. Denies homicidal ideations.  Without medications complaints    Psychiatric Review of Systems:  Behavior over the last 24 hours:  Improving  Sleep: improved  Appetite: adequate  Medication side effects: w/o complaints   Medical ROS: pain location:back , hip, took PRN tylenol and Ibuprofen      Vitals:  Vitals:    12/09/23 0727 12/09/23 1528 12/09/23 2059 12/10/23 0750   BP: 163/86 146/82 (!) 182/84 132/75   BP Location: Right arm Left arm Left arm Right arm   Pulse: 87 92 86 81   Resp: 16 16 16 17   Temp: 97.6 °F (36.4 °C) (!) 97.3 °F (36.3 °C) (!) 97.1 °F (36.2 °C) 97.5 °F (36.4 °C)   TempSrc: Temporal Temporal Temporal Temporal   SpO2: 90% 98% 95% 97%   Weight:    52.4 kg (115 lb 9.6 oz)   Height:             Mental Status Exam:    Appearance:  Lori Waldrop , is a 52 y.o.  female , alert, appears stated age, and casually dressed   Behavior:  cooperative and pleasant, intermittent eye contact   + Restless/fidgets   Speech:  normal rate, normal volume, and talkative     Mood:  Anxious   Affect:  Bright to slightly anxious   Thought Process:  logical, circumstantial   Thought Content:  no verbalized delusions or overt paranoia, more positive tone   Perceptual Disturbances: Does not appear responding or preoccupied    Risk Potential: Suicidal ideation  w/o  Homicidal ideation - w/o  Potential for aggression - w/o   Sensorium:  oriented to person, place( SL), and time/date ( Sun, Dec 10, 2023), situation   Memory:  recent and remote memory grossly intact   Consciousness:  alert and awake   Attention/Concentration: attention span and concentration appear shorter than expected for age   Insight:  appropriate   Judgment: appropriate   Gait/Station: Normal    Motor Activity: no abnormal movements     Progress Toward Goals: Improving     Recommended Treatment: Continue with pharmacotherapy, group therapy, milieu therapy and occupational therapy. Continue frequent safety checks and vitals per unit protocol. Continue with medical management as indicated. Continue coordinating with case management regarding disposition  1. Continue current medications and treatment  2.   Discharge planning      Risks, benefits and possible side effects of Medications: Risks, benefits, and possible side effects of medications have been explained to the patient, who verbalizes understanding      Current Medications:  Current Facility-Administered Medications   Medication Dose Route Frequency Provider Last Rate    acetaminophen  975 mg Oral Q6H [facility] KENNETH Lawrence      albuterol  2 puff Inhalation Q4H PRN Stefanie Riddle, CRNP      aluminum-magnesium hydroxide-simethicone  30 mL Oral Q4H PRN Ellyntierney Jeong, CRNP      ARIPiprazole  5 mg Oral Daily Lopez A Prayson, DO      benztropine  1 mg Intramuscular Q4H PRN Max 6/day Ellyn Lizz Gifty, CRNP      benztropine  0.5 mg Oral Q4H PRN Max 6/day Ellyntierney Zamudio Gifty, CRNP      bisacodyl  10 mg Rectal Daily PRN Ellyn Lizz [place], CRNP      cyanocobalamin  1,000 mcg Oral Daily Stefanie Janessa, CRNP      cyanocobalamin  1,000 mcg Intramuscular Q30 Days Stefanie Riddle, CRNP      DULoxetine  90 mg Oral Daily Lopez A Prayson, DO      gabapentin  600 mg Oral TID Julieta Krishnan, CRNP      glycerin-hypromellose-  1 drop Both Eyes Q3H PRN Ellyntierney Zamudio [place], CRNP      hydrOXYzine HCL  25 mg Oral Q6H PRN Max 4/day Ellyntierney Mahmoodne Gifty, CRNP      hydrOXYzine HCL  50 mg Oral Q6H PRN Max 4/day Ellyntierney Zamudio Gifty, CRNP      ibuprofen  600 mg Oral Q8H [address] [facility], CR[facility]      insulin glargine  20 Units Subcutaneous HS Ellyntierney Durbin, CRNP      insulin lispro  1-5 Units Subcutaneous TID AC Stefanie GerowGarrick, CRNP      insulin lispro  1-5 Units Subcutaneous HS Stefaniearturo Riddle, CRNP      lidocaine  1 patch Topical HS Stefaniearturo Riddle, CRNP      LORazepam  1 mg Intramuscular Q6H PRN Max 3/day Ellyn Zamudio Gifty, CRNP      LORazepam  1 mg Oral Q6H PRN Max 3/day Ellyn Zamudio [place], CRNP      melatonin  3 mg Oral HS Ellyntierney Zamudio [place], CRNP      meloxicam  7.5 mg Oral Daily Stefaniearturo Riddle, CRNP      metFORMIN  1,000 mg Oral BID With Meals Stefaniearturo Riddle, CRNP      methocarbamol  750 mg Oral Q8H [address] KENNETH Lawrence      nicotine  1 patch Transdermal Daily KENNETH Lwarence      OLANZapine  5 mg Intramuscular Q3H PRN Max 3/day KENNETH Hurley      OLANZapine  2.5 mg Oral Q4H PRN Max 6/day KENNETH Hurley      OLANZapine  5 mg Oral Q4H PRN Max 3/day Monica Durbin, CRNP      OLANZapine  5 mg Oral Q3H PRN Max 3/day Ellyn Smartn, CRNP      polyethylene glycol  17 g Oral Daily PRN Ellyn Smartn, CRNP      propranolol  5 mg Oral Q8H PRN Ellyn Fuchs, CRNP      senna-docusate sodium  1 tablet Oral Daily PRN Ellyn Smartn, CRNP      traZODone  50 mg Oral HS Johnie Gardner DO         Behavioral Health Medications:   all current active meds have been reviewed. Laboratory results:  I have personally reviewed all pertinent laboratory/tests results. Recent Results (from the past 48 hour(s))   Fingerstick Glucose (POCT)    Collection Time: 12/08/23  4:25 PM   Result Value Ref Range    POC Glucose 187 (H) 65 - 140 mg/dl   Fingerstick Glucose (POCT)    Collection Time: 12/08/23  9:09 PM   Result Value Ref Range    POC Glucose 182 (H) 65 - 140 mg/dl   Fingerstick Glucose (POCT)    Collection Time: 12/09/23  5:17 AM   Result Value Ref Range    POC Glucose 87 65 - 140 mg/dl   Fingerstick Glucose (POCT)    Collection Time: 12/09/23  7:25 AM   Result Value Ref Range    POC Glucose 97 65 - 140 mg/dl   Fingerstick Glucose (POCT)    Collection Time: 12/09/23 11:40 AM   Result Value Ref Range    POC Glucose 230 (H) 65 - 140 mg/dl   Fingerstick Glucose (POCT)    Collection Time: 12/09/23  4:37 PM   Result Value Ref Range    POC Glucose 159 (H) 65 - 140 mg/dl   Fingerstick Glucose (POCT)    Collection Time: 12/09/23  9:13 PM   Result Value Ref Range    POC Glucose 176 (H) 65 - 140 mg/dl   Fingerstick Glucose (POCT)    Collection Time: 12/10/23  7:33 AM   Result Value Ref Range    POC Glucose 66 65 - 140 mg/dl   Fingerstick Glucose (POCT)    Collection Time: 12/10/23 11:40 AM   Result Value Ref Range    POC Glucose 102 65 - 140 mg/dl            Counseling / Coordination of Care  Patient Encounter Time: 12:05- 12:13PM  Documentation Time: 2:12- 2:24PM  Total floor / unit time spent today 25 minutes.  Greater than 50% of total time was spent with the patient and / or family counseling and / or coordination of care. A description of the counseling / coordination of care: Risk assessment, stabilization planning, treatment management       This note may have been written with the assistance of dictation software. Please excuse any grammatical  errors, misspellings,  and abnormal spacing of letters , sentences or paragraphs .  For accurate interpretation should read note Horizontally

## 2023-12-10 NOTE — TREATMENT TEAM
12/10/23 1424   Perales Anxiety Scale   Anxious Mood 1   Tension 1   Fears 1   Insomnia 0   Intellectual 1   Depressed Mood 1   Somatic Complaints: Muscular 1   Somatic Complaints: Sensory 0   Cardiovascular Symptoms 0   Respiratory Symptoms 0   Gastrointestinal Symptoms 0   Genitourinary Symptoms 0   Autonomic Symptoms 1   Behavior at Interview 1   Perales Anxiety Score 8     PRN Atarax 25 given for Ham score 8

## 2023-12-10 NOTE — TREATMENT TEAM
12/10/23 1200   Pain Assessment   Pain Assessment Tool 0-10   Pain Score 7   Pain Location/Orientation Orientation: Lower; Location: Back   Pain Radiating Towards legs   Pain Onset/Description Onset: Ongoing   Effect of Pain on Daily Activities limiting   Patient's Stated Pain Goal No pain   Hospital Pain Intervention(s) Medication (See MAR)   Multiple Pain Sites No     Scheduled Tylenol 975 given for chronic 7/10 lower back pain

## 2023-12-11 LAB
GLUCOSE SERPL-MCNC: 142 MG/DL (ref 65–140)
GLUCOSE SERPL-MCNC: 175 MG/DL (ref 65–140)
GLUCOSE SERPL-MCNC: 216 MG/DL (ref 65–140)
GLUCOSE SERPL-MCNC: 65 MG/DL (ref 65–140)

## 2023-12-11 PROCEDURE — 99232 SBSQ HOSP IP/OBS MODERATE 35: CPT

## 2023-12-11 PROCEDURE — 82948 REAGENT STRIP/BLOOD GLUCOSE: CPT

## 2023-12-11 RX ORDER — ARIPIPRAZOLE 15 MG/1
7.5 TABLET ORAL
Status: DISCONTINUED | OUTPATIENT
Start: 2023-12-12 | End: 2023-12-13 | Stop reason: HOSPADM

## 2023-12-11 RX ORDER — ARIPIPRAZOLE 5 MG/1
2.5 TABLET ORAL ONCE
Status: DISCONTINUED | OUTPATIENT
Start: 2023-12-11 | End: 2023-12-11

## 2023-12-11 RX ORDER — INSULIN LISPRO 100 [IU]/ML
1-5 INJECTION, SOLUTION INTRAVENOUS; SUBCUTANEOUS
Status: DISCONTINUED | OUTPATIENT
Start: 2023-12-11 | End: 2023-12-13 | Stop reason: HOSPADM

## 2023-12-11 RX ORDER — ARIPIPRAZOLE 5 MG/1
2.5 TABLET ORAL ONCE
Status: COMPLETED | OUTPATIENT
Start: 2023-12-11 | End: 2023-12-11

## 2023-12-11 RX ORDER — ARIPIPRAZOLE 15 MG/1
7.5 TABLET ORAL
Status: DISCONTINUED | OUTPATIENT
Start: 2023-12-11 | End: 2023-12-11

## 2023-12-11 RX ADMIN — IBUPROFEN 600 MG: 600 TABLET ORAL at 06:15

## 2023-12-11 RX ADMIN — TRAZODONE HYDROCHLORIDE 50 MG: 50 TABLET ORAL at 21:14

## 2023-12-11 RX ADMIN — INSULIN GLARGINE 20 UNITS: 100 INJECTION, SOLUTION SUBCUTANEOUS at 21:15

## 2023-12-11 RX ADMIN — HYDROXYZINE HYDROCHLORIDE 50 MG: 50 TABLET, FILM COATED ORAL at 09:53

## 2023-12-11 RX ADMIN — ACETAMINOPHEN 975 MG: 325 TABLET ORAL at 17:09

## 2023-12-11 RX ADMIN — LIDOCAINE 5% 1 PATCH: 700 PATCH TOPICAL at 21:14

## 2023-12-11 RX ADMIN — CYANOCOBALAMIN TAB 1000 MCG 1000 MCG: 1000 TAB at 09:06

## 2023-12-11 RX ADMIN — METHOCARBAMOL 750 MG: 750 TABLET, FILM COATED ORAL at 21:14

## 2023-12-11 RX ADMIN — ARIPIPRAZOLE 5 MG: 5 TABLET ORAL at 09:06

## 2023-12-11 RX ADMIN — IBUPROFEN 600 MG: 600 TABLET ORAL at 21:14

## 2023-12-11 RX ADMIN — INSULIN LISPRO 1 UNITS: 100 INJECTION, SOLUTION INTRAVENOUS; SUBCUTANEOUS at 21:16

## 2023-12-11 RX ADMIN — GABAPENTIN 600 MG: 300 CAPSULE ORAL at 21:14

## 2023-12-11 RX ADMIN — METHOCARBAMOL 750 MG: 750 TABLET, FILM COATED ORAL at 06:15

## 2023-12-11 RX ADMIN — ALBUTEROL SULFATE 2 PUFF: 90 AEROSOL, METERED RESPIRATORY (INHALATION) at 09:51

## 2023-12-11 RX ADMIN — IBUPROFEN 600 MG: 600 TABLET ORAL at 14:13

## 2023-12-11 RX ADMIN — GABAPENTIN 600 MG: 300 CAPSULE ORAL at 09:02

## 2023-12-11 RX ADMIN — GABAPENTIN 600 MG: 300 CAPSULE ORAL at 17:08

## 2023-12-11 RX ADMIN — MELATONIN TAB 3 MG 3 MG: 3 TAB at 21:14

## 2023-12-11 RX ADMIN — ALBUTEROL SULFATE 2 PUFF: 90 AEROSOL, METERED RESPIRATORY (INHALATION) at 21:38

## 2023-12-11 RX ADMIN — INSULIN LISPRO 1 UNITS: 100 INJECTION, SOLUTION INTRAVENOUS; SUBCUTANEOUS at 12:17

## 2023-12-11 RX ADMIN — ALBUTEROL SULFATE 2 PUFF: 90 AEROSOL, METERED RESPIRATORY (INHALATION) at 17:15

## 2023-12-11 RX ADMIN — NICOTINE 1 PATCH: 21 PATCH, EXTENDED RELEASE TRANSDERMAL at 09:08

## 2023-12-11 RX ADMIN — MELOXICAM 7.5 MG: 15 TABLET ORAL at 09:05

## 2023-12-11 RX ADMIN — METFORMIN HYDROCHLORIDE 1000 MG: 500 TABLET, FILM COATED ORAL at 09:05

## 2023-12-11 RX ADMIN — ARIPIPRAZOLE 2.5 MG: 5 TABLET ORAL at 21:13

## 2023-12-11 RX ADMIN — DULOXETINE HYDROCHLORIDE 90 MG: 30 CAPSULE, DELAYED RELEASE ORAL at 09:03

## 2023-12-11 RX ADMIN — METFORMIN HYDROCHLORIDE 1000 MG: 500 TABLET, FILM COATED ORAL at 17:07

## 2023-12-11 RX ADMIN — ACETAMINOPHEN 975 MG: 325 TABLET ORAL at 11:03

## 2023-12-11 RX ADMIN — METHOCARBAMOL 750 MG: 750 TABLET, FILM COATED ORAL at 14:14

## 2023-12-11 NOTE — NURSING NOTE
Patient is visible coloring and reading her books with selected peer. Patient is currently denying anxiety, depression, SI/HI/AVH at this time. Patient is medications and meal complaint. Patient is pleasant and attentive on approach. Able to make needs known.

## 2023-12-11 NOTE — NURSING NOTE
Patient is visible on the unit and social with selected peers. patient is going to groups , medication compliant. Patient is bright and pleasant.  Denies SI,HI,A,V/H

## 2023-12-11 NOTE — PROGRESS NOTES
12/11/23 0844   Team Meeting   Meeting Type Daily Rounds   Initial Conference Date 12/11/23   Team Members Present   Team Members Present Physician;Nurse;;; Occupational Therapist   Physician Team Member Dipti   Nursing Team Member [facility] Management Team Member [facility] [address] Work Team Member  --    OT Team Member Alva Payton   Patient/Family Present   Patient Present No   Patient's Family Present No     Visible and social. Denies most symptoms but does endorse some anxiety throughout the day. She has good insight and has done a great job on relapse prevention plan and made it extensive. Discharge is pending.

## 2023-12-11 NOTE — PLAN OF CARE
Problem: Depression  Goal: Treatment Goal: Demonstrate behavioral control of depressive symptoms, verbalize feelings of improved mood/affect, and adopt new coping skills prior to discharge  Outcome: Progressing     Problem: Anxiety  Goal: Anxiety is at manageable level  Description: Interventions:  - Assess and monitor patient's anxiety level. - Monitor for signs and symptoms (heart palpitations, chest pain, shortness of breath, headaches, nausea, feeling jumpy, restlessness, irritable, apprehensive). - Collaborate with interdisciplinary team and initiate plan and interventions as ordered.   - [place] patient to unit/surroundings  - Explain treatment plan  - Encourage participation in care  - Encourage verbalization of concerns/fears  - Identify coping mechanisms  - Assist in developing anxiety-reducing skills  - Administer/offer alternative therapies  - Limit or eliminate stimulants  Outcome: Progressing

## 2023-12-11 NOTE — PROGRESS NOTES
Progress Note - [address] [facility] 52 y.o. female MRN: [id number]  Unit/Bed#: Afia Lamb 153-05 Encounter: [id number]  Code Status: Level 1 - Full Code    Assessment/Plan   Principal Problem:    Severe episode of recurrent major depressive disorder, without psychotic features ([address] [facility])  Active Problems:    Medical clearance for psychiatric admission    Intractable low back pain    Type 2 diabetes mellitus with hemoglobin A1c goal of less than 7.0% ([facility])    Vitamin B12 deficiency    Methamphetamine abuse in remission ([facility])    Tobacco abuse    Opioid use disorder    Recommended Treatment:     Treatment plan, treatment progress and medication changes were reviewed with Nursing Staff, Pharmacy Service and Case Management in Treatment Team:  1. Continue with group therapy, milieu therapy and occupational therapy   2. Behavioral Health checks every 7 minutes   3. Continue frequent safety checks and vitals per unit protocol  4. Continue with SLIM medical management as indicated  5. Continue with current medication regimen: Will increase Abilify to 7.5mg PO QHS, Cymbalta 90mg PO Daily, Neurontin 600mg PO TID, Melatonin 3mg PO QHS, Trazodone 50mg PO QHS  6. Disposition Planning: Discharge planning and efforts remain ongoing - Will return to private residence - will plan for discharge Wednesday assuming patient continues to improve    Subjective:    Patient was seen today for continuation of care, records reviewed and patient was discussed with the morning case review team.    Saturnino Moulton was seen today for psychiatric follow-up. On assessment today, Saturnino Moulton was found sitting in the dayroom. She is doing well, continues to verbalize that she is feeling better stating "That Saturnino Moulton who thought about walking out in front of the bus is gone". She reports an improvement in symptoms since admission. Still struggles with some irritability relating to her peers but is redirectable.   Saturnino Moulton reports adequate daytime energy and denies any difficulties with initiating or staying asleep. Oral appetite and hydration is adequate. We reviewed once more the specific as-needed medications they can use going forward if they experience any insomnia or destabilization of their mood, they understood and were agreeable. Milieu visibility and group attendance encouraged to promote an active participation in treatment. Carine Duong denies acute suicidal/self-harm ideation/intent/plan upon direct inquiry at this time. Carine Duong is able to contract for safety while on the unit and would feel comfortable seeking staff support should suicidal symptoms or urges appear or worsen. Carine Duong remains behaviorally appropriate, no agitation or aggression noted on exam or in report. Carine Duong also denies HI/AH/VH, and does not appear overtly manic. Patient does not verbalize any experiences that can be categorized as paranoid, persecutory, bizarre, or somatic delusions. Carine Duong remains adherent to her current psychotropic medication regimen and denies any side effects from medications, as well as none noted on exam.    Review of Systems:  Behavior over the last 24 hours: Unchanged  Sleep: sleeping okay throughout the night  Appetite: adequate  Medication side effects: none reported  ROS:no complaints, all other systems are negative    Objective:    Vitals:  Vitals:    12/11/23 0751   BP: 133/68   Pulse: 82   Resp: 18   Temp: (!) 97.4 °F (36.3 °C)   SpO2: 94%     Laboratory Results:  I have personally reviewed all pertinent laboratory/tests results.   Most Recent Labs:   Lab Results   Component Value Date    WBC 7.34 12/05/2023    RBC 4.96 12/05/2023    HGB 14.0 12/05/2023    HCT 42.9 12/05/2023     12/05/2023    RDW 12.7 12/05/2023    NEUTROABS 4.60 12/05/2023    SODIUM 137 12/05/2023    K 4.3 12/05/2023     12/05/2023    CO2 28 12/05/2023    BUN 17 12/05/2023    CREATININE 0.51 (L) 12/05/2023    GLUC 132 12/05/2023    GLUF 132 (H) 12/05/2023    CALCIUM 9.4 12/05/2023    AST 21 12/05/2023    ALT 23 12/05/2023    ALKPHOS 56 12/05/2023    TP 6.5 12/05/2023    ALB 4.3 12/05/2023    TBILI 0.47 12/05/2023    CHOLESTEROL 160 12/05/2023    HDL 64 12/05/2023    TRIG 65 12/05/2023    LDLCALC 83 12/05/2023    NONHDLC 96 12/05/2023    XEO6MXOLYQLI 0.636 12/05/2023    HGBA1C 9.8 (H) 12/05/2023     12/05/2023     Mental Status Evaluation:  Appearance:  age appropriate, casually dressed, dressed appropriately, adequate grooming   Behavior:  pleasant, cooperative, good eye contact   Speech:  normal rate, normal volume, normal pitch   Mood:  anxious but improving   Affect:  reactive, slightly brighter   Thought Process:  organized, logical, coherent, goal directed   Associations: intact associations   Thought Content:  no overt delusions, ruminating thoughts   Perceptual Disturbances: no auditory hallucinations, no visual hallucinations, denies when asked, does not appear responding to internal stimuli   Risk Potential: Suicidal ideation - None at present, contracts for safety on the unit, would talk to staff if not feeling safe on the unit  Homicidal ideation - None at present  Potential for aggression - Not at present   Sensorium:  oriented to person, place, and time/date   Memory:  recent memory intact   Consciousness:  alert and awake   Attention/Concentration: attention span and concentration appear shorter than expected for age   Insight:  fair and improving   Judgment: fair and improving   Gait/Station: normal gait/station, normal balance   Motor Activity: no abnormal movements     Progress Toward Goals: making slow improvement. Ravin Mirza continues to require inpatient psychiatric hospitalization for continued medication management and titration to optimize symptom reduction, improve sleep hygiene, and demonstrate adequate self-care.      Suicide/Homicide Risk Assessment:  Risk of Harm to Self:   Nursing Suicide Risk Assessment Last 24 hours: C-SSRS Risk (Since Last Contact)  Calculated C-SSRS Risk Score (Since Last Contact): No Risk Indicated    Risk of Harm to Others:  Nursing Homicide Risk Assessment: Violence Risk to Others: Denies within past 6 months    Behavioral Health Medications: all current active meds have been reviewed and continue current psychiatric medications.   Current Facility-Administered Medications   Medication Dose Route Frequency Provider Last Rate    acetaminophen  975 mg Oral Q6H [address] [facility] Janessa, KENNETH      albuterol  2 puff Inhalation Q4H PRN Stefaniearturo Riddle, KENNETH      aluminum-magnesium hydroxide-simethicone  30 mL Oral Q4H PRN Meghann Tracie Fothergill, KENNETH      ARIPiprazole  5 mg Oral Daily Lopez A Prayson, DO      benztropine  1 mg Intramuscular Q4H PRN Max 6/day Ellyn Zamudio [place], JACQUELINENP      benztropine  0.5 mg Oral Q4H PRN Max 6/day Ellyn Durbin, JACQUELINENP      bisacodyl  10 mg Rectal Daily PRN Ellyn Durbin, KENNETH      cyanocobalamin  1,000 mcg Oral Daily Stefanie Janessa, CRNP      cyanocobalamin  1,000 mcg Intramuscular Q30 Days Stefanie CeciliaGarrick, KENNETH      DULoxetine  90 mg Oral Daily Lopez A Prayson, DO      gabapentin  600 mg Oral TID KENNETH Dockery      glycerin-hypromellose-  1 drop Both Eyes Q3H PRN Ellyn Durbin, KENNETH      hydrOXYzine HCL  25 mg Oral Q6H PRN Max 4/day Ellyn Zamudio [place], CRNP      hydrOXYzine HCL  50 mg Oral Q6H PRN Max 4/day Ellyn Zamudio [place], CRNP      ibuprofen  600 mg Oral Q8H [address] Stefanie Janessa, CRNP      insulin glargine  20 Units Subcutaneous HS Ellyn Durbin, KENNETH      insulin lispro  1-5 Units Subcutaneous TID AC Stefanie Riddle, CRNP      insulin lispro  1-5 Units Subcutaneous HS Stefaniearturo Riddle, KENNETH      lidocaine  1 patch Topical HS Stefanie Riddle, KENNETH      LORazepam  1 mg Intramuscular Q6H PRN Max 3/day Ellyn Durbin, JACQUELINENP      LORazepam  1 mg Oral Q6H PRN Max 3/day KENNETH Hurley      melatonin  3 mg Oral HS Meghann Tracie Fothergill, CRNP      meloxicam  7.5 mg Oral Daily Stefanie BrooksMary KayMccauley, JACQUELINENP      metFORMIN  1,000 mg Oral BID With Meals Stefanie BrooksMiah, KENNETH      methocarbamol  750 mg Oral Q8H [facility] Stefanie Riddle, KENNETH      nicotine  1 patch Transdermal Daily Stefanie BrooksMiah, CRNP      OLANZapine  5 mg Intramuscular Q3H PRN Max 3/day Ellyn Lizz [place], CRNP      OLANZapine  2.5 mg Oral Q4H PRN Max 6/day Ellyn Lizz [place], CRNP      OLANZapine  5 mg Oral Q4H PRN Max 3/day Ellyn Lizz Gifty, CRNP      OLANZapine  5 mg Oral Q3H PRN Max 3/day Ellyn Lizz [place], CRNP      polyethylene glycol  17 g Oral Daily PRN Ellyn Lizz Gifty, CRNP      propranolol  5 mg Oral Q8H PRN Ellyn Darrick Escobar, CRNP      senna-docusate sodium  1 tablet Oral Daily PRN Ellyn Lizz Gifty, CRNP      traZODone  50 mg Oral HS Stuart Fernandez,        Risks / Benefits of Treatment:  Risks, benefits, and possible side effects of medications explained to patient. Patient has limited understanding of risks and benefits of treatment at this time, but agrees to take medications as prescribed. Counseling / Coordination of Care: Total floor/unit time spent today 25 minutes. Greater than 50% of total time was spent with the patient and / or family counseling and / or coordination of care. A description of the counseling / coordination of care:   Patient's progress discussed with staff in treatment team meeting. Medications, treatment progress and treatment plan reviewed with patient. Educated on importance of medication and treatment compliance. Reassurance and supportive therapy provided. Encouraged participation in milieu and group therapy on the unit.     KENNETH Merino 12/11/23

## 2023-12-11 NOTE — NURSING NOTE
Patient visible on the unit and social with peers. She displays a very bright and outgoing personality this shift. Endorses anxiety but states "I like to stay ahead of it when I can." Medication compliant but refused HS lispro coverage reporting that it makes her feel "woozy" at night. Asked not to be woken up for midnight dose of Tylenol. Safety checks ongoing.

## 2023-12-11 NOTE — NURSING NOTE
Patient c/o anxiety and SOB. PRN Atarax 25 mg and albuterol inhaler administered at 2137. Perales score 7.

## 2023-12-11 NOTE — PROGRESS NOTES
12/11/23 1030 12/11/23 1100 12/11/23 1330   Activity/Group Checklist   Group Community meeting  (seated exercise to music) Admission/Discharge  (Pt. attempted self assessment yet did not complete it.) Life Skills  (asadady cane pattern task on consistency.)   Attendance Attended Attended Attended   Attendance Duration (min) 16-30 16-30 46-60   Interactions Interacted appropriately  (Pt. stated fear that she would have to  leave the session if the music was country due to it triggering her to negatrive thoughts. Pt. remained in full session.) Other (Comment)  (Pt.socially distracted.) Interacted appropriately  (Pt. stated a love of diversity yet was able to complete a consistency task with complicated design and stated enjoyment in to the pattern.)   Affect/Mood Appropriate;Normal range Wide Appropriate   Goals Achieved Able to engage in interactions; Able to listen to others; Identified feelings; Identified triggers; Discussed coping strategies Able to listen to others Able to engage in interactions; Discussed coping strategies; Identified feelings

## 2023-12-11 NOTE — SOCIAL WORK
JULIA reached out to [facility] in order to speak with Betty Huffman about her plan to drop off items to patient. [phone]. They confirmed that her cell number is [phone]. Julia will try that number.  Called Betty Huffman to confirm drop off and give her the directions to the hospital.

## 2023-12-12 PROBLEM — Z00.8 MEDICAL CLEARANCE FOR PSYCHIATRIC ADMISSION: Status: RESOLVED | Noted: 2023-12-05 | Resolved: 2023-12-12

## 2023-12-12 LAB
GLUCOSE SERPL-MCNC: 111 MG/DL (ref 65–140)
GLUCOSE SERPL-MCNC: 135 MG/DL (ref 65–140)
GLUCOSE SERPL-MCNC: 227 MG/DL (ref 65–140)
GLUCOSE SERPL-MCNC: 64 MG/DL (ref 65–140)
GLUCOSE SERPL-MCNC: 74 MG/DL (ref 65–140)

## 2023-12-12 PROCEDURE — 99232 SBSQ HOSP IP/OBS MODERATE 35: CPT

## 2023-12-12 PROCEDURE — 82948 REAGENT STRIP/BLOOD GLUCOSE: CPT

## 2023-12-12 RX ORDER — DULOXETIN HYDROCHLORIDE 30 MG/1
CAPSULE, DELAYED RELEASE ORAL
Qty: 30 CAPSULE | Refills: 1 | Status: SHIPPED | OUTPATIENT
Start: 2023-12-12

## 2023-12-12 RX ORDER — ACETAMINOPHEN 325 MG/1
975 TABLET ORAL EVERY 6 HOURS SCHEDULED
Qty: 120 TABLET | Refills: 1 | Status: SHIPPED | OUTPATIENT
Start: 2023-12-12

## 2023-12-12 RX ORDER — LIDOCAINE 50 MG/G
1 PATCH TOPICAL
Qty: 30 PATCH | Refills: 1 | Status: SHIPPED | OUTPATIENT
Start: 2023-12-12

## 2023-12-12 RX ORDER — ARIPIPRAZOLE 15 MG/1
7.5 TABLET ORAL
Qty: 15 TABLET | Refills: 1 | Status: SHIPPED | OUTPATIENT
Start: 2023-12-12

## 2023-12-12 RX ORDER — INSULIN LISPRO 100 [IU]/ML
1-5 INJECTION, SOLUTION INTRAVENOUS; SUBCUTANEOUS
Qty: 3 ML | Refills: 1 | Status: SHIPPED | OUTPATIENT
Start: 2023-12-12

## 2023-12-12 RX ORDER — METHOCARBAMOL 750 MG/1
750 TABLET, FILM COATED ORAL EVERY 8 HOURS SCHEDULED
Qty: 90 TABLET | Refills: 1 | Status: SHIPPED | OUTPATIENT
Start: 2023-12-12

## 2023-12-12 RX ORDER — MELOXICAM 7.5 MG/1
7.5 TABLET ORAL DAILY
Qty: 30 TABLET | Refills: 1 | Status: SHIPPED | OUTPATIENT
Start: 2023-12-13

## 2023-12-12 RX ORDER — DULOXETIN HYDROCHLORIDE 60 MG/1
CAPSULE, DELAYED RELEASE ORAL
Qty: 30 CAPSULE | Refills: 1 | Status: SHIPPED | OUTPATIENT
Start: 2023-12-12

## 2023-12-12 RX ORDER — GABAPENTIN 300 MG/1
600 CAPSULE ORAL 3 TIMES DAILY
Qty: 180 CAPSULE | Refills: 1 | Status: SHIPPED | OUTPATIENT
Start: 2023-12-12

## 2023-12-12 RX ORDER — HYDROXYZINE 50 MG/1
50 TABLET, FILM COATED ORAL EVERY 8 HOURS PRN
Qty: 10 TABLET | Refills: 0 | Status: SHIPPED | OUTPATIENT
Start: 2023-12-12

## 2023-12-12 RX ORDER — INSULIN GLARGINE 100 [IU]/ML
20 INJECTION, SOLUTION SUBCUTANEOUS
Qty: 10 ML | Refills: 0 | Status: SHIPPED | OUTPATIENT
Start: 2023-12-12

## 2023-12-12 RX ORDER — TRAZODONE HYDROCHLORIDE 50 MG/1
50 TABLET ORAL
Qty: 30 TABLET | Refills: 1 | Status: SHIPPED | OUTPATIENT
Start: 2023-12-12

## 2023-12-12 RX ORDER — LANOLIN ALCOHOL/MO/W.PET/CERES
3 CREAM (GRAM) TOPICAL
Qty: 30 TABLET | Refills: 1 | Status: SHIPPED | OUTPATIENT
Start: 2023-12-12

## 2023-12-12 RX ORDER — IBUPROFEN 600 MG/1
600 TABLET ORAL EVERY 8 HOURS SCHEDULED
Qty: 30 TABLET | Refills: 1 | Status: SHIPPED | OUTPATIENT
Start: 2023-12-12

## 2023-12-12 RX ORDER — NICOTINE 21 MG/24HR
1 PATCH, TRANSDERMAL 24 HOURS TRANSDERMAL DAILY
Qty: 28 PATCH | Refills: 1 | Status: SHIPPED | OUTPATIENT
Start: 2023-12-13

## 2023-12-12 RX ADMIN — METHOCARBAMOL 750 MG: 750 TABLET, FILM COATED ORAL at 21:02

## 2023-12-12 RX ADMIN — HYDROXYZINE HYDROCHLORIDE 50 MG: 50 TABLET, FILM COATED ORAL at 09:31

## 2023-12-12 RX ADMIN — ACETAMINOPHEN 975 MG: 325 TABLET ORAL at 11:04

## 2023-12-12 RX ADMIN — TRAZODONE HYDROCHLORIDE 50 MG: 50 TABLET ORAL at 21:00

## 2023-12-12 RX ADMIN — INSULIN GLARGINE 20 UNITS: 100 INJECTION, SOLUTION SUBCUTANEOUS at 21:42

## 2023-12-12 RX ADMIN — GABAPENTIN 600 MG: 300 CAPSULE ORAL at 09:22

## 2023-12-12 RX ADMIN — ARIPIPRAZOLE 7.5 MG: 15 TABLET ORAL at 21:00

## 2023-12-12 RX ADMIN — LIDOCAINE 5% 1 PATCH: 700 PATCH TOPICAL at 21:00

## 2023-12-12 RX ADMIN — MELOXICAM 7.5 MG: 15 TABLET ORAL at 09:22

## 2023-12-12 RX ADMIN — METFORMIN HYDROCHLORIDE 1000 MG: 500 TABLET, FILM COATED ORAL at 17:12

## 2023-12-12 RX ADMIN — ALBUTEROL SULFATE 2 PUFF: 90 AEROSOL, METERED RESPIRATORY (INHALATION) at 21:49

## 2023-12-12 RX ADMIN — ALBUTEROL SULFATE 2 PUFF: 90 AEROSOL, METERED RESPIRATORY (INHALATION) at 09:31

## 2023-12-12 RX ADMIN — IBUPROFEN 600 MG: 600 TABLET ORAL at 21:02

## 2023-12-12 RX ADMIN — INSULIN LISPRO 2 UNITS: 100 INJECTION, SOLUTION INTRAVENOUS; SUBCUTANEOUS at 21:43

## 2023-12-12 RX ADMIN — METHOCARBAMOL 750 MG: 750 TABLET, FILM COATED ORAL at 14:14

## 2023-12-12 RX ADMIN — GABAPENTIN 600 MG: 300 CAPSULE ORAL at 17:12

## 2023-12-12 RX ADMIN — IBUPROFEN 600 MG: 600 TABLET ORAL at 14:14

## 2023-12-12 RX ADMIN — DULOXETINE HYDROCHLORIDE 90 MG: 30 CAPSULE, DELAYED RELEASE ORAL at 09:25

## 2023-12-12 RX ADMIN — NICOTINE 1 PATCH: 21 PATCH, EXTENDED RELEASE TRANSDERMAL at 09:24

## 2023-12-12 RX ADMIN — ACETAMINOPHEN 975 MG: 325 TABLET ORAL at 17:13

## 2023-12-12 RX ADMIN — GABAPENTIN 600 MG: 300 CAPSULE ORAL at 20:57

## 2023-12-12 RX ADMIN — CYANOCOBALAMIN TAB 1000 MCG 1000 MCG: 1000 TAB at 09:24

## 2023-12-12 RX ADMIN — MELATONIN TAB 3 MG 3 MG: 3 TAB at 21:00

## 2023-12-12 RX ADMIN — IBUPROFEN 600 MG: 600 TABLET ORAL at 06:23

## 2023-12-12 RX ADMIN — METHOCARBAMOL 750 MG: 750 TABLET, FILM COATED ORAL at 06:23

## 2023-12-12 RX ADMIN — METFORMIN HYDROCHLORIDE 1000 MG: 500 TABLET, FILM COATED ORAL at 07:23

## 2023-12-12 NOTE — PROGRESS NOTES
Progress Note - [address] [facility] 52 y.o. female MRN: [id number]  Unit/Bed#: Nemesio Monday 816-24 Encounter: [id number]  Code Status: Level 1 - Full Code    Assessment/Plan   Principal Problem:    Severe episode of recurrent major depressive disorder, without psychotic features ([address] [facility])  Active Problems:    Medical clearance for psychiatric admission    Intractable low back pain    Type 2 diabetes mellitus with hemoglobin A1c goal of less than 7.0% ([facility])    Vitamin B12 deficiency    Methamphetamine abuse in remission ([facility])    Tobacco abuse    Opioid use disorder    Recommended Treatment:     Treatment plan, treatment progress and medication changes were reviewed with Nursing Staff, Pharmacy Service and Case Management in Treatment Team:  1. Continue with group therapy, milieu therapy and occupational therapy   2. Behavioral Health checks every 7 minutes   3. Continue frequent safety checks and vitals per unit protocol  4. Continue with SLIM medical management as indicated  5. Continue with current medication regimen: Abilify 7.5mg PO QHS, Cymbalta 90mg PO Daily, Neurontin 600mg PO TID, Melatonin 3mg PO QHS, Trazodone 50mg PO QHS   6. Disposition Planning: Discharge planning and efforts remain ongoing - Will return to private residence    Subjective:    Patient was seen today for continuation of care, records reviewed and patient was discussed with the morning case review team.    Adan Pichardo was seen today for psychiatric follow-up. On assessment today, Adan Pichardo was found in the dayroom. We spoke about her progress, she feels like this program has been incredibly helpful for her. She said she is not the same person who came in, said "that Adan Pichardo is gone, this Adan Pichardo is hopeful". She said she felt numb prior to admission and now she is feeling emotions and processing them. We spoke about possible discharge tomorrow and she feels like she is ready. She is more hopeful and goal directed.   Feels very well supported by her outpatient team and son. Sofie Allen reports adequate daytime energy and denies any difficulties with initiating or staying asleep. Oral appetite and hydration is adequate. We reviewed once more the specific as-needed medications they can use going forward if they experience any insomnia or destabilization of their mood, they understood and were agreeable. Milieu visibility and group attendance encouraged to promote an active participation in treatment. Sofie Allen denies acute suicidal/self-harm ideation/intent/plan upon direct inquiry at this time. Sofie Allen is able to contract for safety while on the unit and would feel comfortable seeking staff support should suicidal symptoms or urges appear or worsen. Sofie Allen remains behaviorally appropriate, no agitation or aggression noted on exam or in report. Sofie Allen also denies HI/AH/VH, and does not appear overtly manic. Patient does not verbalize any experiences that can be categorized as paranoid, persecutory, bizarre, or somatic delusions. Sofie Allen remains adherent to her current psychotropic medication regimen and denies any side effects from medications, as well as none noted on exam.    Group Attendance: 3/ 3  ADL's: Fair  PT/OT Eval: Not indicated at this time  Speech Eval: Not indicated at this time  Psychiatric PRN's Needed: PRN Atarax yesterday    Review of Systems:  Behavior over the last 24 hours: Slowly improving  Sleep: sleeping okay throughout the night  Appetite: adequate  Medication side effects: none reported  ROS:no complaints, all other systems are negative    Objective:    Vitals:  Vitals:    12/12/23 0732   BP: 131/91   Pulse: 88   Resp: 18   Temp: 97.7 °F (36.5 °C)   SpO2: 94%     Laboratory Results:  I have personally reviewed all pertinent laboratory/tests results.   Most Recent Labs:   Lab Results   Component Value Date    WBC 7.34 12/05/2023    RBC 4.96 12/05/2023    HGB 14.0 12/05/2023    HCT 42.9 12/05/2023     12/05/2023    RDW 12.7 12/05/2023    NEUTROABS 4.60 12/05/2023    SODIUM 137 12/05/2023    K 4.3 12/05/2023     12/05/2023    CO2 28 12/05/2023    BUN 17 12/05/2023    CREATININE 0.51 (L) 12/05/2023    GLUC 132 12/05/2023    GLUF 132 (H) 12/05/2023    CALCIUM 9.4 12/05/2023    AST 21 12/05/2023    ALT 23 12/05/2023    ALKPHOS 56 12/05/2023    TP 6.5 12/05/2023    ALB 4.3 12/05/2023    TBILI 0.47 12/05/2023    CHOLESTEROL 160 12/05/2023    HDL 64 12/05/2023    TRIG 65 12/05/2023    LDLCALC 83 12/05/2023    NONHDLC 96 12/05/2023    EDH7DDEJGLVY 0.636 12/05/2023    HGBA1C 9.8 (H) 12/05/2023     12/05/2023     Mental Status Evaluation:  Appearance:  age appropriate, casually dressed, dressed appropriately   Behavior:  pleasant, cooperative, calm, fair eye contact   Speech:  normal rate, normal volume, normal pitch   Mood:  less anxious, less depressed   Affect:  reactive, slightly brighter   Thought Process:  circumstantial   Associations: circumstantial associations   Thought Content:  no overt delusions   Perceptual Disturbances: no auditory hallucinations, no visual hallucinations, denies when asked, does not appear responding to internal stimuli   Risk Potential: Suicidal ideation - None at present, contracts for safety on the unit, would talk to staff if not feeling safe on the unit  Homicidal ideation - None at present  Potential for aggression - Not at present   Sensorium:  oriented to person, place, and time/date   Memory:  recent memory intact   Consciousness:  alert and awake   Attention/Concentration: attention span and concentration appear shorter than expected for age   Insight:  fair and improving   Judgment: fair and improving   Gait/Station: normal gait/station, normal balance   Motor Activity: no abnormal movements     Progress Toward Goals: making slow improvement.   Luther Krabbe continues to require inpatient psychiatric hospitalization for continued medication management and titration to optimize symptom reduction, improve sleep hygiene, and demonstrate adequate self-care. Suicide/Homicide Risk Assessment:  Risk of Harm to Self:   Nursing Suicide Risk Assessment Last 24 hours: C-SSRS Risk (Since Last Contact)  Calculated C-SSRS Risk Score (Since Last Contact): No Risk Indicated    Risk of Harm to Others:  Nursing Homicide Risk Assessment: Violence Risk to Others: Denies within past 6 months    Behavioral Health Medications: all current active meds have been reviewed and continue current psychiatric medications.   Current Facility-Administered Medications   Medication Dose Route Frequency Provider Last Rate    acetaminophen  975 mg Oral Q6H [address] Stefanie Janessa, KENNETH      albuterol  2 puff Inhalation Q4H PRN KENNETH Lawrence      aluminum-magnesium hydroxide-simethicone  30 mL Oral Q4H PRN Ellyntierney Zamudio [place] cohn      ARIPiprazole  7.5 mg Oral HS Domi Juan, KENNETH      benztropine  1 mg Intramuscular Q4H PRN Max 6/day Ellyn Lizz Gifty, KENNETH      benztropine  0.5 mg Oral Q4H PRN Max 6/day Ellyntierney Zamudio [place] cohn      bisacodyl  10 mg Rectal Daily PRN Ellyn Lizz [place], KENNETH      cyanocobalamin  1,000 mcg Oral Daily Stefanie Riddle, KENNETH      cyanocobalamin  1,000 mcg Intramuscular Q30 Days KENNETH Lawrence      DULoxetine  90 mg Oral Daily Lopez Fuller, DO      gabapentin  600 mg Oral TID KENNETH Alvarez      glycerin-hypromellose-  1 drop Both Eyes Q3H PRN Ellyn Lizz Gifty, JACQUELINENP      hydrOXYzine HCL  25 mg Oral Q6H PRN Max 4/day Ellyn Lizz [place], JACQUELINENP      hydrOXYzine HCL  50 mg Oral Q6H PRN Max 4/day Ellyn Lizz [place] nicole      ibuprofen  600 mg Oral Q8H [address] Stefanie Riddle, KENNETH      insulin glargine  20 Units Subcutaneous HS Ellyn Durbin, KENNETH      insulin lispro  1-5 Units Subcutaneous HS StefanieKENNETH Huerta      insulin lispro  1-5 Units Subcutaneous TID AC KENNETH Lawrence      lidocaine  1 patch Topical HS KENNETH Lawrence      LORazepam 1 mg Intramuscular Q6H PRN Max 3/day Ellyn Lizz Estelline, CRNP      LORazepam  1 mg Oral Q6H PRN Max 3/day Ellyn Lizz Estelline, CRNP      melatonin  3 mg Oral HS Ellyn Lizz Estelline, CRNP      meloxicam  7.5 mg Oral Daily Stefanie Riddle, CRNP      metFORMIN  1,000 mg Oral BID With Meals Stefanie Riddle, CRNP      methocarbamol  750 mg Oral Q8H [address] Stefanie Riddle, CRNP      nicotine  1 patch Transdermal Daily Stefanie Riddle, CRNP      OLANZapine  5 mg Intramuscular Q3H PRN Max 3/day Ellyn Lizz Estelline, CRNP      OLANZapine  2.5 mg Oral Q4H PRN Max 6/day Ellyn Lizz Estelline, CRNP      OLANZapine  5 mg Oral Q4H PRN Max 3/day Ellyn Lizz Estelline, CRNP      OLANZapine  5 mg Oral Q3H PRN Max 3/day Ellyn Lizz Gifty, CRNP      polyethylene glycol  17 g Oral Daily PRN Ellyn Lizz Gifty, CRNP      propranolol  5 mg Oral Q8H PRN Ellyn Frankie Lek, CRNP      senna-docusate sodium  1 tablet Oral Daily PRN Ellyn Lizz Estelline, CRNP      traZODone  50 mg Oral HS Carlyon Net, DO       Risks / Benefits of Treatment:  Risks, benefits, and possible side effects of medications explained to patient. Patient has limited understanding of risks and benefits of treatment at this time, but agrees to take medications as prescribed. Counseling / Coordination of Care: Total floor/unit time spent today 25 minutes. Greater than 50% of total time was spent with the patient and / or family counseling and / or coordination of care. A description of the counseling / coordination of care:   Patient's progress discussed with staff in treatment team meeting. Medications, treatment progress and treatment plan reviewed with patient. Educated on importance of medication and treatment compliance. Reassurance and supportive therapy provided. Encouraged participation in milieu and group therapy on the unit.     KENNETH Patel 12/12/23

## 2023-12-12 NOTE — PROGRESS NOTES
12/12/23 1030 12/12/23 1300   Activity/Group Checklist   Group Community meeting  (self esteem group.) Pet therapy   Attendance Attended Attended   Attendance Duration (min) 46-60 16-30   Interactions Interacted appropriately Interacted appropriately   Affect/Mood Appropriate;Bright;Normal range Bright; Appropriate;Calm;Normal range   Goals Achieved Able to reflect/comment on own behavior;Able to listen to others; Able to engage in interactions; Identified feelings; Discussed coping strategies Able to engage in interactions

## 2023-12-12 NOTE — PLAN OF CARE
Problem: DISCHARGE PLANNING  Goal: Discharge to home or other facility with appropriate resources  Description: INTERVENTIONS:  - Identify barriers to discharge w/patient and caregiver  - Arrange for needed discharge resources and transportation as appropriate  - Identify discharge learning needs (meds, wound care, etc.)  - Arrange for interpretive services to assist at discharge as needed  - Refer to Case Management Department for coordinating discharge planning if the patient needs post-hospital services based on physician/advanced practitioner order or complex needs related to functional status, cognitive ability, or social support system  Outcome: Completed  You are being discharged via [facility] at 11am on Wednesday Morning. You have follow up appointments scheduled with your primary care provider, psychiatrist, and therapist. Your medication has been sent to your preferred pharmacy as requested. Please refer to your discharge paperwork for all discharge instructions.

## 2023-12-12 NOTE — PROGRESS NOTES
12/12/23 0904   Team Meeting   Meeting Type Daily Rounds   Initial Conference Date 12/12/23   Team Members Present   Team Members Present Physician;Nurse;;; Occupational Therapist   Physician Team Member Dipti   Nursing Team Member [facility] Management Team Member Marilee Castillo   Patient/Family Present   Patient Present No   Patient's Family Present No     Currently fixated on albuterol inhaler. Struggling with another patient who is intrusive. Otherwise, calm, visible, and going to groups. Discharge is pending.

## 2023-12-12 NOTE — NURSING NOTE
Pt becoming anxious. Perales score of 22 recorded. Atarax given as ordered.  Pt also requested inhaler

## 2023-12-12 NOTE — PLAN OF CARE
Pt. Supportive of peers when attending groups and is smiling more in conversation. Engages in all groups as scheduled.   Problem: Ineffective Coping  Goal: Participates in unit activities  Description: Interventions:  - Provide therapeutic environment   - Provide required programming   - Redirect inappropriate behaviors   Outcome: Progressing

## 2023-12-12 NOTE — BH TRANSITION RECORD
Contact Information: If you have any questions, concerns, pended studies, tests and/or procedures, or emergencies regarding your inpatient behavioral health visit. Please contact [address] older adult behavioral health unit 6T [phone] and ask to speak to a , nurse or physician. A contact is available 24 hours/ 7 days a week at this number. Summary of Procedures Performed During your Stay:  Below is a list of major procedures performed during your hospital stay and a summary of results:  - Cardiac Procedures/Studies: EKG on 12/4 showed normal sinus with occasional premature ventricular complexes. Pending Studies (From admission, onward)      None          Please follow up on the above pending studies with your PCP and/or referring provider.

## 2023-12-12 NOTE — PLAN OF CARE
Problem: Depression  Goal: Treatment Goal: Demonstrate behavioral control of depressive symptoms, verbalize feelings of improved mood/affect, and adopt new coping skills prior to discharge  Outcome: Progressing     Problem: Anxiety  Goal: Anxiety is at manageable level  Description: Interventions:  - Assess and monitor patient's anxiety level. - Monitor for signs and symptoms (heart palpitations, chest pain, shortness of breath, headaches, nausea, feeling jumpy, restlessness, irritable, apprehensive). - Collaborate with interdisciplinary team and initiate plan and interventions as ordered.   - Manor patient to unit/surroundings  - Explain treatment plan  - Encourage participation in care  - Encourage verbalization of concerns/fears  - Identify coping mechanisms  - Assist in developing anxiety-reducing skills  - Administer/offer alternative therapies  - Limit or eliminate stimulants  Outcome: Progressing

## 2023-12-12 NOTE — DISCHARGE SUMMARY
Discharge Summary - [address] 52 y.o. female MRN: [id number]  Unit/Bed#: Jerad Cadet 923-46 Encounter: [id number]     Admission Date: 12/4/2023         Discharge Date: 12/13/2023    Attending Psychiatrist: Sudhir Dye DO    Reason for Admission/HPI:     According to H&P completed by Dr. Phyllis Wray on 12/05/2023: On evaluation, patient was calm pleasant and cooperative sitting in bed. Patient was organized coherent and seems to have a sense of humor. Patient reports stressors of ongoing chronic back pain. She reports back pain since February 2020 but going through the process is not giving her any results and when the pain hits she thinks about not being alive. Patient has pain management but also has a physiatrist whom she was using as a pain management person. However, the patient has a history of opioid abuse as well as amphetamine abuse and alcohol use. She has been trying to manage without opiates but the pain gets overwhelming. She reports that the current pain management is trying to get discogram before she can even schedule a surgery. Patient does not feel she can hold on that long sometimes. Patient does say that she has been living in shelter recently and when the pain hits at night she leaves the shelter and goes to the bus terminal where she suffers without waking anybody else up at the shelter. She reports prior to admission she had thoughts of jumping in front of a bus and waited a long time contemplating jumping in front of a bus. She did seek help and was taken to the emergency department by her .  Patient reports also family stressors, 1 being of her son's dog being killed in a hit and run and she was unable to console her son or the grandson. She feels like since she has been a mom she should be able to do these things but she is unable to at this time. Patient reports being clean from substances since October 2023.   She reports a chaotic childhood not knowing her mother. Patient currently does not have suicidal thoughts on the unit. She contracts for safety here. Patient denies having thoughts to hurt anyone else. Denies any auditory or visual hallucinations or feeling paranoid. She does not verbalize any delusional material.  Patient reports that 1 time being diagnosed with bipolar disorder but denies actual manic episodes only mood swings. She had been on lithium in the past but does not want to take that again. Patient is agreeable to start Abilify starting at 2 mg daily as an adjunct for depression along with her Cymbalta which was increased over the past month to 90 mg daily. Social History       Tobacco History       Smoking Status  Every Day Smoking Frequency  0.50 packs/day Smoking Tobacco Type  Cigarettes      Smokeless Tobacco Use  Former              Alcohol History       Alcohol Use Status  Not Currently              Drug Use       Drug Use Status  Not Currently              Sexual Activity       Sexually Active  Not Asked              Activities of Daily Living    Not Asked                   Past Medical History:   Diagnosis Date    Alcohol abuse     Chronic pain disorder     Diabetes mellitus ([address])     Hypercholesteremia     Medical clearance for psychiatric admission 12/05/2023          No past surgical history on file. Medications: All current active medications have been reviewed. Medications prior to admission:    Prior to Admission Medications   Prescriptions Last Dose Informant Patient Reported? Taking?    DULoxetine (CYMBALTA) 30 mg delayed release capsule 12/4/2023  Yes Yes   Sig: Take 30 mg by mouth daily at bedtime   DULoxetine (CYMBALTA) 60 mg delayed release capsule 12/4/2023  Yes Yes   Sig: Take 60 mg by mouth daily   gabapentin (NEURONTIN) 300 mg capsule 12/4/2023  Yes Yes   Sig: Take 300 mg by mouth Three times a day   insulin glargine (LANTUS) 100 units/mL subcutaneous injection 12/4/2023  Yes Yes Sig: Inject 20 Units under the skin daily at bedtime   insulin lispro (HumaLOG) 100 units/mL injection 12/4/2023  Yes Yes   Sig: Inject under the skin Sliding scale   meloxicam (MOBIC) 15 mg tablet 12/4/2023  Yes Yes   Sig: Take 15 mg by mouth daily   metFORMIN (GLUCOPHAGE) 1000 MG tablet 12/4/2023  Yes Yes   Sig: Take 1,000 mg by mouth 2 (two) times a day   methocarbamol (ROBAXIN) 500 mg tablet 12/4/2023  Yes Yes   Sig: Take 750 mg by mouth Three times daily as needed      Facility-Administered Medications: None     Allergies:     No Known Allergies    Objective     Vital signs in last 24 hours:    Temp:  [97.7 °F (36.5 °C)-98.6 °F (37 °C)] 97.7 °F (36.5 °C)  HR:  [88-95] 88  Resp:  [18-19] 18  BP: (131-169)/(76-91) 131/91      Intake/Output Summary (Last 24 hours) at 12/12/2023 1205  Last data filed at 12/12/2023 0732  Gross per 24 hour   Intake 900 ml   Output --   Net 900 ml     Hospital Course:     Stephen Rojas was admitted to the inpatient psychiatric unit and started on Behavioral Health checks every 7 minutes. During the hospitalization she was encouraged to attend individual therapy, group therapy, milieu therapy and occupational therapy. Stephen Rojas was treated with a multidisciplinary approach that included daily lethality assessments, pharmacotherapy, psychotherapy as well as consultation to hospital internal medicine. Psychiatric medications were titrated over the hospital stay. To address depressive symptoms, mood instability, anxiety symptoms, and insomnia, Stephen Rojas was treated with antidepressant Cymbalta, antipsychotic medication Abilify, anxiolytic medication Neurontin, and hypnotic medication Trazodone and Melatonin. Medication doses were added and adjusted during the hospital course.   The patient was discharged on the following medication regimen:    Abilify 7.5mg PO Daily for mood stability and to adjunct the Cymbalta  Cymbalta 90mg PO Daily for depression and anxiety  Neurontin 600mg PO TID for anxiety, sleep, and mood lability  Melatonin 3mg PO QHS for insomnia  Trazodone 50mg PO QHS for insomnia    Prior to beginning of treatment medications risks and benefits and possible side effects including risk of parkinsonian symptoms, Tardive Dyskinesia and metabolic syndrome related to treatment with antipsychotic medications, risk of cardiovascular events in elderly related to treatment with antipsychotic medications, risk of suicidality and serotonin syndrome related to treatment with antidepressants, and risk of impaired next-day mental alertness, complex sleep-related behavior and dependence related to treatment with hypnotic medications were reviewed with Fabiola Louis. She verbalized understanding and agreement for treatment. Upon admission Fabiola Louis was seen by medical service for medical clearance for inpatient treatment and medical follow up. Pepes symptoms slowly improved over the hospital course. Initially after admission she was still feeling depressed, anxious, frustrated, and overwhelmed. With adjustment of medications and therapeutic milieu her symptoms slowly improved. At the end of treatment Fabiola Louis was doing much better. Her mood  was less depressed and less anxious at the time of discharge. Her affect was more bright, her concentration and energy improved, and she was more hopeful for the future. She was goal directed and future oriented. She was visible and social in the milieu and had excellent group attendance. Her sleep and oral intake were improved. She was adherent to her medication regimen and expressed a desire to remain on medications once discharged. Her insight and judgement were also improved at the time of discharge. At the moment, patient's acute lethality risk in the community is LOW, long-term/chronic lethality risk is mildly elevated given a history of substance use, chronic psychosocial difficulties, and chronic mental health symptoms.  These chronic risk factors cannot be mitigated with further inpatient hospitalization which is not currently warranted. To mitigate future risk, patient should adhere to treatment recommendations set forth above, avoid alcohol/illicit substance use, and prioritize mental health treatment. Patient has no recent history of suicidal gestures/attempts and does not currently have access to weapons/firearms. Patient is future-oriented and demonstrates ability to act in a self-preserving manner. Fabiola Louis has maximally benefited from inpatient admission and thus, will be discharged with appropriate, outpatient linkage. Patient is not at acute risk of harm to self or others. Risk has been mitigated by inpatient stay and treatment. Fabiola Louis verbalized an adequate safety plan to utilize post discharge. This includes: "call my son", "call the number on the paper", and lastly, patient was encouraged to seek the nearest Emergency Department should suicidal thoughts arise. On the day of discharge, Fabiola Louis appears pleasant, calm and cooperative. Denies any symptoms of depression, octavio/hypomania or psychosis. Fabiola Louis denied suicidal ideation, intent or plan at the time of discharge and denied homicidal ideation, intent or plan at the time of discharge. There was no overt psychosis at the time of discharge. Fabiola Louis was participating appropriately in milieu at the time of discharge. Behavior was appropriate on the unit at the time of discharge. Sleep and appetite were improved. Fabiola Louis was tolerating medications and was not reporting any significant side effects at the time of discharge. Since Fabiola Louis was doing well at the end of the hospitalization, treatment team felt that she could be safely discharged to outpatient care. Fabiola Louis expressed their noted improvement and was in agreement with discharge. The outpatient follow up was arranged by the unit  upon discharge. Time was afforded for questions and all questions were addressed.   Patient was given the number for the Suicide and Crisis Lifeline at 65 as well as their local [address] in case they find themselves in a psychiatric emergency in the future:  Antoinette Ceja will follow-up with [facility] on 12/15 @ 0900 for medication management  She will also follow-up with her PCP on 12/19 @ 0920  30 days worth of medications (+1 refill) were electronically sent to patients preferred pharmacy  PDMP reviewed and noted below:    08/22/2023 08/22/2023 2 Oxycodone-Acetaminophen 5-325 10.00 2 Ora Men [id number] Rit (5216) 0 37.50 MME [place]   11/29/2022 11/29/2022 1 Tramadol Hcl 50 Mg Tablet 20.00 5 Ni Rev [id number] Was (5959) 0 40.00 [place]   11/23/2022 11/10/2022 1 Tramadol Hcl 50 Mg Tablet 5.00 1 Mo Elm [id number] Was (5959) 0 50.00 [place]   11/11/2022 11/10/2022 1 Tramadol Hcl 50 Mg Tablet 15.00 5 Mo Elm [id number] Was (5959) 0 30.00 [place]   11/10/2022 11/10/2022 1 Tramadol Hcl 50 Mg Tablet 20.00 7 Mo Elm [id number] Was (5959) 0 28.57 [place]ME [place]   01/17/2022 08/12/2021 1 Lorazepam 1 Mg Tablet 50.00 30 Josyed Leger [id number] Was ([id number])         Mental Status at Time of Discharge:     Appearance:  age appropriate, casually dressed, dressed appropriately, adequate grooming   Behavior:  pleasant, cooperative, calm   Speech:  normal rate, normal volume, normal pitch   Mood:  improved, euthymic   Affect:  normal range and intensity, appropriate   Thought Process:  organized, logical, coherent, goal directed   Associations: intact associations   Thought Content:  no overt delusions   Perceptual Disturbances: no auditory hallucinations, no visual hallucinations, denies when asked, does not appear responding to internal stimuli   Risk Potential: Suicidal ideation - Antoinette Ceja denies acute suicidal/self-harm ideation/intent/plan upon direct inquiry at this time.   Safety plan reviewed and patient in agreement  Homicidal ideation - None at present  Potential for aggression - Not at present   Sensorium:  oriented to person, place, and time/date   Memory:  recent memory intact   Consciousness:  alert and awake   Attention/Concentration: attention span and concentration appear shorter than expected for age   Insight:  fair and improving   Judgment: fair and improving   Gait/Station: normal gait/station, normal balance   Motor Activity: no abnormal movements     Suicide/Homicide Risk Assessment:    Risk of Harm to Self:   The following ratings are based on assessment at the time of discharge, review of the hospital stay progress, and review of records  Demographic risk factors include: , age: over 48 or older  Historical Risk Factors include: chronic psychiatric problems, history of suicide attempt, substance use  Current Specific Risk Factors include: recent inpatient psychiatric admission - being discharged today, recent suicidal ideation, mental illness diagnosis, health problems, chronic pain, substance use  Protective Factors: improved mood, improved anxiety symptoms, improved impulse control, ability to adapt to change, ability to manage anger well, ability to make plans for the future, no current suicidal plan or intent, outpatient psychiatric follow up established, being a parent, stable housing, resiliency, responsibilities and duties to others  Weapons/Firearms: none. The following steps have been taken to ensure weapons are properly secured: not applicable  Based on today's assessment, Sofie Allen presents the following risk of harm to self: low    Risk of Harm to Others: The following ratings are based on assessment at the time of discharge, review of the hospital stay progress, and review of records  Demographic Risk Factors include: unemployed. Historical Risk Factors include: drug abuse.   Current Specific Risk Factors include: recent difficulty with impulse control, behavior suggesting impulsivity, multiple stressors, social difficulties, risk taking, noncompliance with treatment  Protective Factors: no current homicidal ideation, improved impulse control, improved mood, no current psychotic symptoms, compliant with medications, willing to continue psychiatric treatment, willing to remain free from substance use, outpatient follow up established, ability to adapt to change, able to manage anger well, effective coping skills, supportive family  Weapons/Firearms: none. The following steps have been taken to ensure weapons are properly secured: not applicable  Based on today's assessment, Ravin Mirza presents the following risk of harm to others: low    The following interventions are recommended: outpatient follow up with a psychiatrist, follow up with family physician for medical issues    Admission Diagnosis:    Principal Problem:    Severe episode of recurrent major depressive disorder, without psychotic features ([address])  Active Problems:    Medical clearance for psychiatric admission    Intractable low back pain    Type 2 diabetes mellitus with hemoglobin A1c goal of less than 7.0% ([address])    Vitamin B12 deficiency    Methamphetamine abuse in remission ([address])    Tobacco abuse    Opioid use disorder    Discharge Diagnosis:     Principal Problem:    Severe episode of recurrent major depressive disorder, without psychotic features ([address])  Active Problems:    Medical clearance for psychiatric admission    Intractable low back pain    Type 2 diabetes mellitus with hemoglobin A1c goal of less than 7.0% ([address])    Vitamin B12 deficiency    Methamphetamine abuse in remission ([address])    Tobacco abuse    Opioid use disorder  Resolved Problems:    * No resolved hospital problems. *    Lab Results: I have personally reviewed all pertinent laboratory/tests results.   Most Recent Labs:   Lab Results   Component Value Date    WBC 7.34 12/05/2023    RBC 4.96 12/05/2023    HGB 14.0 12/05/2023    HCT 42.9 12/05/2023     12/05/2023    RDW 12.7 12/05/2023    NEUTROABS 4.60 12/05/2023    SODIUM 137 12/05/2023    K 4.3 12/05/2023     12/05/2023    CO2 28 12/05/2023 BUN 17 12/05/2023    CREATININE 0.51 (L) 12/05/2023    GLUC 132 12/05/2023    GLUF 132 (H) 12/05/2023    CALCIUM 9.4 12/05/2023    AST 21 12/05/2023    ALT 23 12/05/2023    ALKPHOS 56 12/05/2023    TP 6.5 12/05/2023    ALB 4.3 12/05/2023    TBILI 0.47 12/05/2023    CHOLESTEROL 160 12/05/2023    HDL 64 12/05/2023    TRIG 65 12/05/2023    LDLCALC 83 12/05/2023    NONHDLC 96 12/05/2023    CLX9VIFZPSZW 0.636 12/05/2023    HGBA1C 9.8 (H) 12/05/2023     12/05/2023     Discharge Medications:    See after visit summary for all reconciled discharge medications provided to patient and family. Discharge instructions/Information to patient and family:     See after visit summary for information provided to patient and family. Provisions for Follow-Up Care:    See after visit summary for information related to follow-up care and any pertinent home health orders. Discharge Statement:    I spent 35 minutes discharging the patient. This time was spent on the day of discharge. I had direct contact with the patient on the day of discharge. Additional documentation is required if more than 30 minutes were spent on discharge:    I reviewed with Saturnino Moulton importance of compliance with medications and outpatient treatment after discharge. I discussed the medication regimen and possible side effects of the medications with Saturnino Moulton prior to discharge. At the time of discharge she was tolerating psychiatric medications. I discussed outpatient follow up with Saturnino Moulton. I reviewed with Saturnino Moulton crisis plan and safety plan upon discharge. Saturnino Moulton agreed to abstain from drug and alcohol use after discharge. Saturnino Moulton was competent to understand risks and benefits of withholding information and risks and benefits of her actions.   Saturnino Moulton has been filing controlled prescriptions on time as prescribed according to Christine1 Prieto Ambrose.    Discharge on Two Antipsychotic Medications : KENNETH Messer 12/12/23

## 2023-12-13 VITALS
HEART RATE: 86 BPM | TEMPERATURE: 97.5 F | RESPIRATION RATE: 18 BRPM | DIASTOLIC BLOOD PRESSURE: 71 MMHG | OXYGEN SATURATION: 94 % | HEIGHT: 62 IN | BODY MASS INDEX: 21.27 KG/M2 | SYSTOLIC BLOOD PRESSURE: 139 MMHG | WEIGHT: 115.6 LBS

## 2023-12-13 LAB — GLUCOSE SERPL-MCNC: 101 MG/DL (ref 65–140)

## 2023-12-13 PROCEDURE — 82948 REAGENT STRIP/BLOOD GLUCOSE: CPT

## 2023-12-13 PROCEDURE — 99239 HOSP IP/OBS DSCHRG MGMT >30: CPT

## 2023-12-13 RX ADMIN — METHOCARBAMOL 750 MG: 750 TABLET, FILM COATED ORAL at 06:30

## 2023-12-13 RX ADMIN — NICOTINE 1 PATCH: 21 PATCH, EXTENDED RELEASE TRANSDERMAL at 08:17

## 2023-12-13 RX ADMIN — GABAPENTIN 600 MG: 300 CAPSULE ORAL at 08:15

## 2023-12-13 RX ADMIN — DULOXETINE HYDROCHLORIDE 90 MG: 30 CAPSULE, DELAYED RELEASE ORAL at 08:13

## 2023-12-13 RX ADMIN — CYANOCOBALAMIN TAB 1000 MCG 1000 MCG: 1000 TAB at 08:15

## 2023-12-13 RX ADMIN — MELOXICAM 7.5 MG: 15 TABLET ORAL at 08:14

## 2023-12-13 RX ADMIN — METFORMIN HYDROCHLORIDE 1000 MG: 500 TABLET, FILM COATED ORAL at 08:15

## 2023-12-13 RX ADMIN — ACETAMINOPHEN 975 MG: 325 TABLET ORAL at 06:30

## 2023-12-13 NOTE — PROGRESS NOTES
12/13/23 0854   Team Meeting   Meeting Type Daily Rounds   Initial Conference Date 12/13/23   Team Members Present   Team Members Present Physician;Nurse;; Occupational Therapist;Other (Discipline and Name)   Physician Team Member Yris   Nursing Team Member [facility] Management Team Member [address] Work Team Member Ed   OT Team Member Ele Stephenson   Other (Discipline and Name) [phone]   Patient/Family Present   Patient Present No   Patient's Family Present No     Patient is calm and cooperative.  Discharge is set for this morning via L[facility]FT at 11am.

## 2023-12-13 NOTE — NURSING NOTE
Patient is visible socializing with peers in dinning room. Patient is social and friendly to staff and peers. Patient reports minimal anxiety and "feeling ready for discharge tomorrow". Patient is currently denying depression, SI/HI/AVH at this time. Patient is medications complaint. Able to make needs known.

## 2023-12-13 NOTE — PLAN OF CARE
Problem: Risk for Self Injury/Neglect  Goal: Treatment Goal: Remain safe during length of stay, learn and adopt new coping skills, and be free of self-injurious ideation, impulses and acts at the time of discharge  Outcome: Progressing  Goal: Refrain from harming self  Description: Interventions:  - Monitor patient closely, per order  - Develop a trusting relationship  - Supervise medication ingestion, monitor effects and side effects   Outcome: Progressing  Goal: Recognize maladaptive responses and adopt new coping mechanisms  Outcome: Progressing     Problem: Depression  Goal: Treatment Goal: Demonstrate behavioral control of depressive symptoms, verbalize feelings of improved mood/affect, and adopt new coping skills prior to discharge  Outcome: Progressing     Problem: Anxiety  Goal: Anxiety is at manageable level  Description: Interventions:  - Assess and monitor patient's anxiety level. - Monitor for signs and symptoms (heart palpitations, chest pain, shortness of breath, headaches, nausea, feeling jumpy, restlessness, irritable, apprehensive). - Collaborate with interdisciplinary team and initiate plan and interventions as ordered.   - [place] patient to unit/surroundings  - Explain treatment plan  - Encourage participation in care  - Encourage verbalization of concerns/fears  - Identify coping mechanisms  - Assist in developing anxiety-reducing skills  - Administer/offer alternative therapies  - Limit or eliminate stimulants  Outcome: Progressing

## 2023-12-13 NOTE — NURSING NOTE
Pt is in agreement with discharge. Pt denies depression and anxiety; denies SI/HI/AH/VH. All belongings returned and sent with pt. After visit summary reviewed and sent with pt. Pt acknowledges understanding and offers no complaints.

## 2024-01-18 DIAGNOSIS — F33.2 SEVERE EPISODE OF RECURRENT MAJOR DEPRESSIVE DISORDER, WITHOUT PSYCHOTIC FEATURES (HCC): ICD-10-CM

## 2024-02-09 DIAGNOSIS — Z00.8 MEDICAL CLEARANCE FOR PSYCHIATRIC ADMISSION: ICD-10-CM

## 2024-04-17 RX ORDER — ARIPIPRAZOLE 15 MG/1
TABLET ORAL
Qty: 15 TABLET | Refills: 1 | OUTPATIENT
Start: 2024-04-17

## 2024-04-17 RX ORDER — TRAZODONE HYDROCHLORIDE 50 MG/1
50 TABLET ORAL
Qty: 30 TABLET | Refills: 1 | OUTPATIENT
Start: 2024-04-17

## 2024-05-09 RX ORDER — LIDOCAINE 50 MG/G
PATCH TOPICAL
Qty: 30 PATCH | Refills: 1 | OUTPATIENT
Start: 2024-05-09

## 2024-10-18 DIAGNOSIS — M54.50 CHRONIC BILATERAL LOW BACK PAIN WITHOUT SCIATICA: ICD-10-CM

## 2024-10-18 DIAGNOSIS — G89.29 CHRONIC BILATERAL LOW BACK PAIN WITHOUT SCIATICA: ICD-10-CM

## 2024-12-17 RX ORDER — METHOCARBAMOL 750 MG/1
750 TABLET, FILM COATED ORAL EVERY 8 HOURS
Qty: 90 TABLET | Refills: 1 | OUTPATIENT
Start: 2024-12-17